# Patient Record
Sex: MALE | Race: WHITE | NOT HISPANIC OR LATINO | Employment: FULL TIME | URBAN - METROPOLITAN AREA
[De-identification: names, ages, dates, MRNs, and addresses within clinical notes are randomized per-mention and may not be internally consistent; named-entity substitution may affect disease eponyms.]

---

## 2017-01-27 ENCOUNTER — OFFICE VISIT - HEALTHEAST (OUTPATIENT)
Dept: ADDICTION MEDICINE | Facility: CLINIC | Age: 27
End: 2017-01-27

## 2017-01-27 DIAGNOSIS — Z03.89 NO DIAGNOSIS ON AXIS I: ICD-10-CM

## 2017-02-07 ENCOUNTER — OFFICE VISIT - HEALTHEAST (OUTPATIENT)
Dept: ADDICTION MEDICINE | Facility: CLINIC | Age: 27
End: 2017-02-07

## 2017-02-07 DIAGNOSIS — F10.20 SEVERE ALCOHOL USE DISORDER (H): ICD-10-CM

## 2017-02-08 ENCOUNTER — OFFICE VISIT - HEALTHEAST (OUTPATIENT)
Dept: ADDICTION MEDICINE | Facility: CLINIC | Age: 27
End: 2017-02-08

## 2017-02-08 DIAGNOSIS — F10.20 SEVERE ALCOHOL USE DISORDER (H): ICD-10-CM

## 2017-02-09 ENCOUNTER — OFFICE VISIT - HEALTHEAST (OUTPATIENT)
Dept: ADDICTION MEDICINE | Facility: CLINIC | Age: 27
End: 2017-02-09

## 2017-02-09 DIAGNOSIS — F10.20 SEVERE ALCOHOL USE DISORDER (H): ICD-10-CM

## 2017-02-10 ENCOUNTER — AMBULATORY - HEALTHEAST (OUTPATIENT)
Dept: ADDICTION MEDICINE | Facility: CLINIC | Age: 27
End: 2017-02-10

## 2017-02-13 ENCOUNTER — OFFICE VISIT - HEALTHEAST (OUTPATIENT)
Dept: ADDICTION MEDICINE | Facility: CLINIC | Age: 27
End: 2017-02-13

## 2017-02-13 DIAGNOSIS — F10.20 SEVERE ALCOHOL USE DISORDER (H): ICD-10-CM

## 2017-02-14 ENCOUNTER — OFFICE VISIT - HEALTHEAST (OUTPATIENT)
Dept: ADDICTION MEDICINE | Facility: CLINIC | Age: 27
End: 2017-02-14

## 2017-02-14 DIAGNOSIS — F10.20 SEVERE ALCOHOL USE DISORDER (H): ICD-10-CM

## 2017-02-15 ENCOUNTER — OFFICE VISIT - HEALTHEAST (OUTPATIENT)
Dept: ADDICTION MEDICINE | Facility: CLINIC | Age: 27
End: 2017-02-15

## 2017-02-15 DIAGNOSIS — F10.20 SEVERE ALCOHOL USE DISORDER (H): ICD-10-CM

## 2017-02-16 ENCOUNTER — OFFICE VISIT - HEALTHEAST (OUTPATIENT)
Dept: ADDICTION MEDICINE | Facility: CLINIC | Age: 27
End: 2017-02-16

## 2017-02-16 DIAGNOSIS — F10.20 SEVERE ALCOHOL USE DISORDER (H): ICD-10-CM

## 2017-02-19 ENCOUNTER — AMBULATORY - HEALTHEAST (OUTPATIENT)
Dept: ADDICTION MEDICINE | Facility: CLINIC | Age: 27
End: 2017-02-19

## 2017-02-20 ENCOUNTER — OFFICE VISIT - HEALTHEAST (OUTPATIENT)
Dept: ADDICTION MEDICINE | Facility: CLINIC | Age: 27
End: 2017-02-20

## 2017-02-20 DIAGNOSIS — F10.20 SEVERE ALCOHOL USE DISORDER (H): ICD-10-CM

## 2017-02-21 ENCOUNTER — OFFICE VISIT - HEALTHEAST (OUTPATIENT)
Dept: ADDICTION MEDICINE | Facility: CLINIC | Age: 27
End: 2017-02-21

## 2017-02-21 DIAGNOSIS — F10.20 SEVERE ALCOHOL USE DISORDER (H): ICD-10-CM

## 2017-02-22 ENCOUNTER — OFFICE VISIT - HEALTHEAST (OUTPATIENT)
Dept: ADDICTION MEDICINE | Facility: CLINIC | Age: 27
End: 2017-02-22

## 2017-02-22 DIAGNOSIS — F10.20 SEVERE ALCOHOL USE DISORDER (H): ICD-10-CM

## 2017-02-23 ENCOUNTER — OFFICE VISIT - HEALTHEAST (OUTPATIENT)
Dept: ADDICTION MEDICINE | Facility: CLINIC | Age: 27
End: 2017-02-23

## 2017-02-23 DIAGNOSIS — F10.20 SEVERE ALCOHOL USE DISORDER (H): ICD-10-CM

## 2017-02-24 ENCOUNTER — AMBULATORY - HEALTHEAST (OUTPATIENT)
Dept: ADDICTION MEDICINE | Facility: CLINIC | Age: 27
End: 2017-02-24

## 2017-02-27 ENCOUNTER — OFFICE VISIT - HEALTHEAST (OUTPATIENT)
Dept: ADDICTION MEDICINE | Facility: CLINIC | Age: 27
End: 2017-02-27

## 2017-02-27 DIAGNOSIS — F10.20 SEVERE ALCOHOL USE DISORDER (H): ICD-10-CM

## 2017-02-28 ENCOUNTER — OFFICE VISIT - HEALTHEAST (OUTPATIENT)
Dept: ADDICTION MEDICINE | Facility: CLINIC | Age: 27
End: 2017-02-28

## 2017-02-28 DIAGNOSIS — F10.20 SEVERE ALCOHOL USE DISORDER (H): ICD-10-CM

## 2017-03-01 ENCOUNTER — OFFICE VISIT - HEALTHEAST (OUTPATIENT)
Dept: ADDICTION MEDICINE | Facility: CLINIC | Age: 27
End: 2017-03-01

## 2017-03-01 DIAGNOSIS — F10.20 SEVERE ALCOHOL USE DISORDER (H): ICD-10-CM

## 2017-03-02 ENCOUNTER — OFFICE VISIT - HEALTHEAST (OUTPATIENT)
Dept: ADDICTION MEDICINE | Facility: CLINIC | Age: 27
End: 2017-03-02

## 2017-03-02 DIAGNOSIS — F10.20 SEVERE ALCOHOL USE DISORDER (H): ICD-10-CM

## 2017-03-06 ENCOUNTER — AMBULATORY - HEALTHEAST (OUTPATIENT)
Dept: ADDICTION MEDICINE | Facility: CLINIC | Age: 27
End: 2017-03-06

## 2017-03-06 ENCOUNTER — OFFICE VISIT - HEALTHEAST (OUTPATIENT)
Dept: ADDICTION MEDICINE | Facility: CLINIC | Age: 27
End: 2017-03-06

## 2017-03-06 DIAGNOSIS — F10.20 SEVERE ALCOHOL USE DISORDER (H): ICD-10-CM

## 2017-03-07 ENCOUNTER — OFFICE VISIT - HEALTHEAST (OUTPATIENT)
Dept: ADDICTION MEDICINE | Facility: CLINIC | Age: 27
End: 2017-03-07

## 2017-03-07 DIAGNOSIS — F10.20 SEVERE ALCOHOL USE DISORDER (H): ICD-10-CM

## 2017-03-08 ENCOUNTER — OFFICE VISIT - HEALTHEAST (OUTPATIENT)
Dept: ADDICTION MEDICINE | Facility: CLINIC | Age: 27
End: 2017-03-08

## 2017-03-08 DIAGNOSIS — F10.20 SEVERE ALCOHOL USE DISORDER (H): ICD-10-CM

## 2017-03-09 ENCOUNTER — OFFICE VISIT - HEALTHEAST (OUTPATIENT)
Dept: ADDICTION MEDICINE | Facility: CLINIC | Age: 27
End: 2017-03-09

## 2017-03-09 DIAGNOSIS — F10.20 SEVERE ALCOHOL USE DISORDER (H): ICD-10-CM

## 2017-03-10 ENCOUNTER — AMBULATORY - HEALTHEAST (OUTPATIENT)
Dept: ADDICTION MEDICINE | Facility: CLINIC | Age: 27
End: 2017-03-10

## 2017-03-13 ENCOUNTER — OFFICE VISIT - HEALTHEAST (OUTPATIENT)
Dept: ADDICTION MEDICINE | Facility: CLINIC | Age: 27
End: 2017-03-13

## 2017-03-13 DIAGNOSIS — F10.20 SEVERE ALCOHOL USE DISORDER (H): ICD-10-CM

## 2017-03-14 ENCOUNTER — COMMUNICATION - HEALTHEAST (OUTPATIENT)
Dept: ADDICTION MEDICINE | Facility: CLINIC | Age: 27
End: 2017-03-14

## 2017-03-14 ENCOUNTER — OFFICE VISIT - HEALTHEAST (OUTPATIENT)
Dept: ADDICTION MEDICINE | Facility: CLINIC | Age: 27
End: 2017-03-14

## 2017-03-14 DIAGNOSIS — F10.20 SEVERE ALCOHOL USE DISORDER (H): ICD-10-CM

## 2017-03-15 ENCOUNTER — OFFICE VISIT - HEALTHEAST (OUTPATIENT)
Dept: ADDICTION MEDICINE | Facility: CLINIC | Age: 27
End: 2017-03-15

## 2017-03-15 DIAGNOSIS — F10.20 SEVERE ALCOHOL USE DISORDER (H): ICD-10-CM

## 2017-03-16 ENCOUNTER — OFFICE VISIT - HEALTHEAST (OUTPATIENT)
Dept: ADDICTION MEDICINE | Facility: CLINIC | Age: 27
End: 2017-03-16

## 2017-03-16 DIAGNOSIS — F10.20 SEVERE ALCOHOL USE DISORDER (H): ICD-10-CM

## 2017-03-17 ENCOUNTER — AMBULATORY - HEALTHEAST (OUTPATIENT)
Dept: ADDICTION MEDICINE | Facility: CLINIC | Age: 27
End: 2017-03-17

## 2017-03-20 ENCOUNTER — OFFICE VISIT - HEALTHEAST (OUTPATIENT)
Dept: ADDICTION MEDICINE | Facility: CLINIC | Age: 27
End: 2017-03-20

## 2017-03-20 DIAGNOSIS — F10.20 SEVERE ALCOHOL USE DISORDER (H): ICD-10-CM

## 2017-03-21 ENCOUNTER — COMMUNICATION - HEALTHEAST (OUTPATIENT)
Dept: ADDICTION MEDICINE | Facility: CLINIC | Age: 27
End: 2017-03-21

## 2017-03-22 ENCOUNTER — OFFICE VISIT - HEALTHEAST (OUTPATIENT)
Dept: ADDICTION MEDICINE | Facility: CLINIC | Age: 27
End: 2017-03-22

## 2017-03-22 DIAGNOSIS — F10.20 SEVERE ALCOHOL USE DISORDER (H): ICD-10-CM

## 2017-03-24 ENCOUNTER — AMBULATORY - HEALTHEAST (OUTPATIENT)
Dept: ADDICTION MEDICINE | Facility: CLINIC | Age: 27
End: 2017-03-24

## 2017-03-27 ENCOUNTER — OFFICE VISIT - HEALTHEAST (OUTPATIENT)
Dept: ADDICTION MEDICINE | Facility: CLINIC | Age: 27
End: 2017-03-27

## 2017-03-27 DIAGNOSIS — F10.20 SEVERE ALCOHOL USE DISORDER (H): ICD-10-CM

## 2017-03-29 ENCOUNTER — OFFICE VISIT - HEALTHEAST (OUTPATIENT)
Dept: ADDICTION MEDICINE | Facility: CLINIC | Age: 27
End: 2017-03-29

## 2017-03-29 DIAGNOSIS — F10.20 SEVERE ALCOHOL USE DISORDER (H): ICD-10-CM

## 2017-03-31 ENCOUNTER — AMBULATORY - HEALTHEAST (OUTPATIENT)
Dept: ADDICTION MEDICINE | Facility: CLINIC | Age: 27
End: 2017-03-31

## 2017-04-04 ENCOUNTER — OFFICE VISIT - HEALTHEAST (OUTPATIENT)
Dept: ADDICTION MEDICINE | Facility: CLINIC | Age: 27
End: 2017-04-04

## 2017-04-04 DIAGNOSIS — F10.20 SEVERE ALCOHOL USE DISORDER (H): ICD-10-CM

## 2017-04-05 ENCOUNTER — OFFICE VISIT - HEALTHEAST (OUTPATIENT)
Dept: ADDICTION MEDICINE | Facility: CLINIC | Age: 27
End: 2017-04-05

## 2017-04-05 DIAGNOSIS — F10.20 SEVERE ALCOHOL USE DISORDER (H): ICD-10-CM

## 2017-04-07 ENCOUNTER — AMBULATORY - HEALTHEAST (OUTPATIENT)
Dept: ADDICTION MEDICINE | Facility: CLINIC | Age: 27
End: 2017-04-07

## 2017-04-10 ENCOUNTER — OFFICE VISIT - HEALTHEAST (OUTPATIENT)
Dept: ADDICTION MEDICINE | Facility: CLINIC | Age: 27
End: 2017-04-10

## 2017-04-10 DIAGNOSIS — F10.20 SEVERE ALCOHOL USE DISORDER (H): ICD-10-CM

## 2017-04-14 ENCOUNTER — AMBULATORY - HEALTHEAST (OUTPATIENT)
Dept: ADDICTION MEDICINE | Facility: CLINIC | Age: 27
End: 2017-04-14

## 2017-04-19 ENCOUNTER — OFFICE VISIT - HEALTHEAST (OUTPATIENT)
Dept: ADDICTION MEDICINE | Facility: CLINIC | Age: 27
End: 2017-04-19

## 2017-04-19 DIAGNOSIS — F10.20 SEVERE ALCOHOL USE DISORDER (H): ICD-10-CM

## 2017-04-20 ENCOUNTER — AMBULATORY - HEALTHEAST (OUTPATIENT)
Dept: ADDICTION MEDICINE | Facility: CLINIC | Age: 27
End: 2017-04-20

## 2017-04-20 ENCOUNTER — COMMUNICATION - HEALTHEAST (OUTPATIENT)
Dept: ADDICTION MEDICINE | Facility: CLINIC | Age: 27
End: 2017-04-20

## 2017-04-24 ENCOUNTER — OFFICE VISIT - HEALTHEAST (OUTPATIENT)
Dept: ADDICTION MEDICINE | Facility: CLINIC | Age: 27
End: 2017-04-24

## 2017-04-24 DIAGNOSIS — F10.20 SEVERE ALCOHOL USE DISORDER (H): ICD-10-CM

## 2017-04-26 ENCOUNTER — OFFICE VISIT - HEALTHEAST (OUTPATIENT)
Dept: ADDICTION MEDICINE | Facility: CLINIC | Age: 27
End: 2017-04-26

## 2017-04-26 DIAGNOSIS — F10.20 SEVERE ALCOHOL USE DISORDER (H): ICD-10-CM

## 2017-04-27 ENCOUNTER — AMBULATORY - HEALTHEAST (OUTPATIENT)
Dept: ADDICTION MEDICINE | Facility: CLINIC | Age: 27
End: 2017-04-27

## 2017-05-01 ENCOUNTER — OFFICE VISIT - HEALTHEAST (OUTPATIENT)
Dept: ADDICTION MEDICINE | Facility: CLINIC | Age: 27
End: 2017-05-01

## 2017-05-01 DIAGNOSIS — F10.20 SEVERE ALCOHOL USE DISORDER (H): ICD-10-CM

## 2017-05-03 ENCOUNTER — OFFICE VISIT - HEALTHEAST (OUTPATIENT)
Dept: ADDICTION MEDICINE | Facility: CLINIC | Age: 27
End: 2017-05-03

## 2017-05-03 DIAGNOSIS — F10.20 SEVERE ALCOHOL USE DISORDER (H): ICD-10-CM

## 2017-05-04 ENCOUNTER — AMBULATORY - HEALTHEAST (OUTPATIENT)
Dept: ADDICTION MEDICINE | Facility: CLINIC | Age: 27
End: 2017-05-04

## 2017-05-11 ENCOUNTER — OFFICE VISIT - HEALTHEAST (OUTPATIENT)
Dept: ADDICTION MEDICINE | Facility: CLINIC | Age: 27
End: 2017-05-11

## 2017-05-11 ENCOUNTER — AMBULATORY - HEALTHEAST (OUTPATIENT)
Dept: ADDICTION MEDICINE | Facility: CLINIC | Age: 27
End: 2017-05-11

## 2017-05-11 DIAGNOSIS — F10.20 SEVERE ALCOHOL USE DISORDER (H): ICD-10-CM

## 2017-05-15 ENCOUNTER — AMBULATORY - HEALTHEAST (OUTPATIENT)
Dept: ADDICTION MEDICINE | Facility: CLINIC | Age: 27
End: 2017-05-15

## 2017-05-18 ENCOUNTER — OFFICE VISIT - HEALTHEAST (OUTPATIENT)
Dept: ADDICTION MEDICINE | Facility: CLINIC | Age: 27
End: 2017-05-18

## 2017-05-18 DIAGNOSIS — F10.20 SEVERE ALCOHOL USE DISORDER (H): ICD-10-CM

## 2017-05-24 ENCOUNTER — AMBULATORY - HEALTHEAST (OUTPATIENT)
Dept: ADDICTION MEDICINE | Facility: CLINIC | Age: 27
End: 2017-05-24

## 2017-05-25 ENCOUNTER — OFFICE VISIT - HEALTHEAST (OUTPATIENT)
Dept: ADDICTION MEDICINE | Facility: CLINIC | Age: 27
End: 2017-05-25

## 2017-05-25 DIAGNOSIS — F10.20 SEVERE ALCOHOL USE DISORDER (H): ICD-10-CM

## 2017-05-29 ENCOUNTER — AMBULATORY - HEALTHEAST (OUTPATIENT)
Dept: ADDICTION MEDICINE | Facility: CLINIC | Age: 27
End: 2017-05-29

## 2017-06-01 ENCOUNTER — OFFICE VISIT - HEALTHEAST (OUTPATIENT)
Dept: ADDICTION MEDICINE | Facility: CLINIC | Age: 27
End: 2017-06-01

## 2017-06-01 DIAGNOSIS — F10.20 SEVERE ALCOHOL USE DISORDER (H): ICD-10-CM

## 2017-06-02 ENCOUNTER — COMMUNICATION - HEALTHEAST (OUTPATIENT)
Dept: ADDICTION MEDICINE | Facility: CLINIC | Age: 27
End: 2017-06-02

## 2017-06-06 ENCOUNTER — AMBULATORY - HEALTHEAST (OUTPATIENT)
Dept: ADDICTION MEDICINE | Facility: CLINIC | Age: 27
End: 2017-06-06

## 2017-06-08 ENCOUNTER — OFFICE VISIT - HEALTHEAST (OUTPATIENT)
Dept: ADDICTION MEDICINE | Facility: CLINIC | Age: 27
End: 2017-06-08

## 2017-06-08 DIAGNOSIS — F10.20 SEVERE ALCOHOL USE DISORDER (H): ICD-10-CM

## 2017-06-09 ENCOUNTER — AMBULATORY - HEALTHEAST (OUTPATIENT)
Dept: ADDICTION MEDICINE | Facility: CLINIC | Age: 27
End: 2017-06-09

## 2017-06-22 ENCOUNTER — OFFICE VISIT - HEALTHEAST (OUTPATIENT)
Dept: ADDICTION MEDICINE | Facility: CLINIC | Age: 27
End: 2017-06-22

## 2017-06-22 DIAGNOSIS — F10.20 SEVERE ALCOHOL USE DISORDER (H): ICD-10-CM

## 2017-06-26 ENCOUNTER — AMBULATORY - HEALTHEAST (OUTPATIENT)
Dept: ADDICTION MEDICINE | Facility: CLINIC | Age: 27
End: 2017-06-26

## 2017-06-29 ENCOUNTER — OFFICE VISIT - HEALTHEAST (OUTPATIENT)
Dept: ADDICTION MEDICINE | Facility: CLINIC | Age: 27
End: 2017-06-29

## 2017-06-29 DIAGNOSIS — F10.20 SEVERE ALCOHOL USE DISORDER (H): ICD-10-CM

## 2017-07-03 ENCOUNTER — AMBULATORY - HEALTHEAST (OUTPATIENT)
Dept: ADDICTION MEDICINE | Facility: CLINIC | Age: 27
End: 2017-07-03

## 2017-07-06 ENCOUNTER — OFFICE VISIT - HEALTHEAST (OUTPATIENT)
Dept: ADDICTION MEDICINE | Facility: CLINIC | Age: 27
End: 2017-07-06

## 2017-07-06 DIAGNOSIS — F10.20 SEVERE ALCOHOL USE DISORDER (H): ICD-10-CM

## 2017-07-10 ENCOUNTER — AMBULATORY - HEALTHEAST (OUTPATIENT)
Dept: ADDICTION MEDICINE | Facility: CLINIC | Age: 27
End: 2017-07-10

## 2017-07-13 ENCOUNTER — OFFICE VISIT - HEALTHEAST (OUTPATIENT)
Dept: ADDICTION MEDICINE | Facility: CLINIC | Age: 27
End: 2017-07-13

## 2017-07-13 DIAGNOSIS — F10.20 SEVERE ALCOHOL USE DISORDER (H): ICD-10-CM

## 2017-07-17 ENCOUNTER — AMBULATORY - HEALTHEAST (OUTPATIENT)
Dept: ADDICTION MEDICINE | Facility: CLINIC | Age: 27
End: 2017-07-17

## 2017-07-20 ENCOUNTER — OFFICE VISIT - HEALTHEAST (OUTPATIENT)
Dept: ADDICTION MEDICINE | Facility: CLINIC | Age: 27
End: 2017-07-20

## 2017-07-20 DIAGNOSIS — F10.20 SEVERE ALCOHOL USE DISORDER (H): ICD-10-CM

## 2017-07-24 ENCOUNTER — AMBULATORY - HEALTHEAST (OUTPATIENT)
Dept: ADDICTION MEDICINE | Facility: CLINIC | Age: 27
End: 2017-07-24

## 2017-08-03 ENCOUNTER — OFFICE VISIT - HEALTHEAST (OUTPATIENT)
Dept: ADDICTION MEDICINE | Facility: CLINIC | Age: 27
End: 2017-08-03

## 2017-08-03 DIAGNOSIS — F10.20 SEVERE ALCOHOL USE DISORDER (H): ICD-10-CM

## 2017-08-07 ENCOUNTER — AMBULATORY - HEALTHEAST (OUTPATIENT)
Dept: ADDICTION MEDICINE | Facility: CLINIC | Age: 27
End: 2017-08-07

## 2017-08-10 ENCOUNTER — OFFICE VISIT - HEALTHEAST (OUTPATIENT)
Dept: ADDICTION MEDICINE | Facility: CLINIC | Age: 27
End: 2017-08-10

## 2017-08-10 DIAGNOSIS — F10.20 SEVERE ALCOHOL USE DISORDER (H): ICD-10-CM

## 2017-08-14 ENCOUNTER — AMBULATORY - HEALTHEAST (OUTPATIENT)
Dept: ADDICTION MEDICINE | Facility: CLINIC | Age: 27
End: 2017-08-14

## 2021-06-08 NOTE — PROGRESS NOTES
Intake Note:   D) Santino Cutler is a 26 y.o.  single White or  male who is referred to [facility] via rule 25 assessment and parents with funding from [employer]. Patient orientated x 3. Patient meets criteria for There is no problem list on file for this patient.    Patient appears appropriate for Intensive Day Outpatient.   A)Completed intake assessment; preliminary paperwork; counselor & supervisor license number and contact info., Patient Bill of Rights, , program rules/regulations, , Abuse Prevention Plan,, confidentiality & HIPPA policies, , grievance procedure,, presented ROIs, , TB & HIV/AIDS policies & resources, and Vulnerable Adult policy, .   Conducted Vulnerable Adult Assessment yes .   R)No special Vulnerable Adult needed at this time. .   Patient signed and agreed to counselor & supervisor license number and contact info., Patient Bill of Rights, , group rules/regulations, , Abuse Prevention Plan,, confidentiality & HIPPA policies, , grievance procedure,, presented ROIs, TB & HIV/AIDS policies & resources, and Vulnerable Adult policy. Patient scored low risk on PANSI screen.   Dimension I Risk Ratin-At this time the patient displays no withdrawal signs or symptoms.  He reports that his last date of use was 17.  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  At this time the patient appears to be fully functioning.  Dimension II Risk Ratin-The patient does report that he has increased liver enzymes at this time.  He does report that he has a primary care physician which whom he sees regularly.  At this time the patient is able to get the care he needs and is able to cope with any discomfort that he may be experiencing.    Dimension III Risk Ratin-At this time the patient has difficulty with impulse control and lacks coping skills for his depression.  The patient does not report any thoughts or means of suicide or homicide.  The patient does have  difficulty managing his emotions and behaviors.  The patient reports he is currently on Lexapro as prescribed by his primary care provider.  The patient is able to participate in treatment activities and function adequately in significant life areas.  Dimension IV Risk Ratin-The patient is motivated for treatment at this time.  He does have some reinforcement as he has a pending DUI charge that will be in court for on .  The patient may be ambivalent about his disease or the need for change.  The patient reports that this is his first time in treatment although he has been told many times that he needed to go.    Dimension V Risk Rating: 3-At this time the patient has a poor recognition and understanding of relapse and recidivism issues.  The patient displays to be at a high risk for relapse as the longest period of sobriety he has had has been 10 days.  The patient reported that during those 10 days most of it was spent detoxing.  The patient would benefit from continued coping skills and relapse prevention knowledge.    Dimension VI Risk Ratin-The patient is engaged in some structured activities, he reports that he attends mens groups, AA meetings, and does work.  He is currently on a disability leave from work at this time to complete treatment.  The patient does have family and friends who are supportive of his recovery.  The patient reports a pending DUI charge in which he will present to court on 3/9/17.  The patient reports he has gotten rid of all of his using friends and reports spending a lot of time with his brother who is supportive of his recovery.T) Explained counselor & supervisor license number and contact info., Patient Bill of Rights, , program rules/regulations, , Abuse Prevention Plan,, confidentiality & HIPPA policies, , grievance procedure,, presented ROIs, , TB & HIV/AIDS policies & resources, and Vulnerable Adult policy.

## 2021-06-08 NOTE — PROGRESS NOTES
Weekly Progress Note  Santino Cutler  1990  [id number]      D) Pt attended 4 groups  this week with 0 absences. A) Staff facilitated groups and reviewed tx progress. Assessed for [facility]. R) No VAP needed at this time. Pt working on the following dimensions:  Dimension I Risk Ratin-At this time the patient displays no withdrawal signs or symptoms. He reports that his last date of use was 17. 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 At this time the patient appears to be fully functioning. The patient is to remain abstinent from all mood altering chemicals (TXPlan 1)   Dimension II Risk Ratin-The patient does report that he has increased liver enzymes at this time. He does report that he has a primary care physician which whom he sees regularly. At this time the patient is able to get the care he needs and is able to cope with any discomfort that he may be experiencing. The patient is to practice living a healthy lifestyle by getting proper nutrition, rest, and exercise (TXPlan 2)   Dimension III Risk Ratin-At this time the patient has difficulty with impulse control and lacks coping skills for his depression. The patient does not report any thoughts or means of suicide or homicide. The patient does have difficulty managing his emotions and behaviors. The patient reports he is currently on Lexapro as prescribed by his primary care provider. The patient is able to participate in treatment activities and function adequately in significant life areas.The patient appears to be stable at this time.   Dimension IV Risk Ratin-The patient is motivated for treatment at this time. He does have some reinforcement as he has a pending DUI charge that will be in court for on . The patient may be ambivalent about his disease or the need for change. The patient reports that this is his first time in treatment although he has been told many times that he needed to go. The  patient will be given an assignment packet with his written and oral assignments (TXPlan 4)  Dimension V Risk Rating: 3-At this time the patient has a poor recognition and understanding of relapse and recidivism issues. The patient displays to be at a high risk for relapse as the longest period of sobriety he has had has been 10 days. The patient reported that during those 10 days most of it was spent detoxing. The patient would benefit from continued coping skills and relapse prevention knowledge. The patient is to report any relapses he may have as soon as possible (TXPlan 5)  At this time the patient does not report any cravings, triggers, or urges to use over the last 7 days.    Dimension VI Risk Ratin-The patient is engaged in some structured activities, he reports that he attends mens groups, AA meetings, and does work. He is currently on a disability leave from work at this time to complete treatment. The patient does have family and friends who are supportive of his recovery. The patient reports a pending DUI charge in which he will present to court on 3/9/17. The patient reports he has gotten rid of all of his using friends and reports spending a lot of time with his brother who is supportive of his recovery.  The patient reports that he is getting the ignition interlock in the next 7 days.  T) Client educated on mental health. Client has completed 19 of 84 hours of program at this time. Projected discharge date is 17. Current discharge plan is relapse prevention.     Sylvie Heaton        Psycho-Educational Curriculum  Date Attended  Psycho-Educational Curriculum  Date Attended    Acceptance   Shame/Guilt     1st Step   Anger/Rage     Affirmations   Mental Health     Automatic Negative Thoughts   Anxiety  17   Cross Addiction   Co-Occurring Disorders  2/15/17   Stages of Change   Kylah/Bipolar  17   Relapse   Trauma      Addictive Thoughts   Victim Identity     Coping Skills   Sober  "Structure     Relapse Prevention   Continuum of Care     Medical Aspects   Non-12 Step Support     Brain/Neurotransmitters   Priorities     Medication Compliance   Spirituality     TEE Alcohol/Drug Research   Weekend Planner     Physical Health   Educational Videos     Post Acute Withdrawal   1st Step     Pregnancy and Drug Use   2nd Step     Sexual Health   Assertive Communication     Short-Term/Long-Term Effects   My name is Celestine HILL    Relationships   Cross Addiction     Assertive Communication   God As We Understood Him     Boundaries   HBO Relapse     Codependence    HBO What Is Addiction     Defense Mechanisms    Medical Aspects 1     Family Roles   Medical Aspects 2     Goodbye Letter   [employer]: Stress     Intimacy   PBS Depression Out of the Shadows     Needs/Dealbreakers in Relationships   The Anonymous People    Socialization Skills   [place]     Feelings   Ezekiel Mckeon \"Highjacked Brain\"    ABC Model of Emotion   Peter Gamez Humor in [place]    Grief and Loss   The Mindfulness Movie    Healthy vs. Unhealthy Feelings   Celestine HILL documentary     Meditation/Mindfulness  2/16/17 Pleasure Unwoven  2/13/17   Overconfidence/Complacency       Resentments       Stress         "

## 2021-06-08 NOTE — PROGRESS NOTES
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 (303.90)  [x] (F10.20) (303.90)   []  Cannabis Use Disorder [] Mild  [] Moderate  [] Severe [] (F12.10) (305.20)  [] (F12.20) (304.30)  [] (F12.20) (304.30)   [] Hallucinogen Use Disorder [] Mild  [] Moderate  [] Severe [] (F16.10) (305.30)  [] (F16.20) (304.50)  [] (F16.20) (304.50)   []  Inhalant Use Disorder [] Mild  [] Moderate  [] Severe [] (F18.10) (305.90)  [] (F18.20) (304.60)  [] (F18.20) (304.60)   []  Opioid Use Disorder [] Mild  [] Moderate  [] Severe [] (F11.10) (305.50)  [] (F11.20) (304.00)  [] (F11.20) (304.00)   []  Sedative, Hypnotic, or Anxiolytic Use Disorder [] Mild  [] Moderate  [] Severe [] (F13.10) (305.40)   [] (F13.20) (304.10)  [] (F13.20) (304.10)   []  Stimulant Related Disorders [] Mild  [] Moderate  [] Severe [] (F15.10) (305.70) Amphetamine type substance  [] (F14.10) (305.60) Cocaine  [] (F15.10) (305.70) Other or unspecified stimulant  [] (F15.20) (304.40) Amphetamine type substance  [] (F14.20) (304.20) Cocaine  [] (F15.20) (304.40) Other or unspecified stimulant  [] (F15.20) (304.40) Amphetamine type substance  [] (F14.20) (304.20) Cocaine  [] (F15.20) (304.40) Other or unspecified stimulant   []  Tobacco use Disorder [] Mild  [] Moderate  [] Severe [] (Z72.0)  (305.1)  [] (F17.200) (305.1)  [] (F17.200) (305.1)   []  Other (or unknown) Substance Use Disorder [] Mild  [] Moderate  [] Severe [] (F19.10) (305.90)  [] (F19.20) (304.90)  [] (F19.20) (304.90)       Suggested Level of Care Necessary for Recovery  []  Inpatient  []  Extended Care []  Residential [x]  Outpatient  []  None            Collateral Contact Summary   Number of contacts made:  1   Contact with referring person:  yes     If court related records were reviewed, summarize here:       [x]   Information from collateral contacts supported/largely agreed with information from the client and associated risk ratings.   []   Information from collateral contacts was significantly different from information from the client and lead to different risk ratings.      Summarize new information here:      Rule 25 Assessment Summary and Plan   's Recommendation    CD Outpatient treatment            Collateral Contacts     Please duplicate this page for each contact.  If this includes information which is sensitive and not public, separate this page from the rest of the assessment before sharing.  Retain the page in the assessment file.   Name    Dima Cutler Relationship    Father Phone Number    [phone] Releases    yes       Information Provided:      Information from collateral coincided with assessment    Collateral Contacts     Name    Selene Bourgeois   Relationship    Parnter   Phone Number    [phone] Releases    yes       Information Provided:        Staff Name and Title:  Adam Tyler MBA, MSW, LGAMILCAR, [facility]    Date:  1/27/2017  Time:  8:25 AM

## 2021-06-08 NOTE — PROGRESS NOTES
Opened in error, disregard  Mary Ann Chen MA, [facility]     Asthma  no recent exacerbations  Cervical disc disease  cervical fusion  Chiari I malformation  decompression Feb 2018  EDS (Su-Danlos syndrome)    Irritable bowel syndrome without diarrhea  with constipation  Low back pain  chronic x 2 1/2 years  Tethered cord syndrome

## 2021-06-08 NOTE — PROGRESS NOTES
1:1 Session-  The patient and this writer met for a 1 hour 1:1 session to review his treatment plan and to discuss how his first week in the program is going.  The patient reviewed his treatment plan and signed it during this session, he did not want to add anything to his tx plan and approved of the things that were in it.  The patient reported that he was able to get his license back over the weekend and is taking care of his ignition interlock system that will be put into his car on Wednesday.  The patient reports feelings of anxiety at this time as he is preparing to tell his fiance's parents that he got a DWI and that he is an alcoholic.  The patient reports that they are strict [employer] who do not drink at all.  The patient appears to be optimistic and excited about the changes he is making at this time.  The patient denied any thoughts or means of harming himself or others and appeared stable.      Sylvie Heaton 2/13/2017

## 2021-06-08 NOTE — PROGRESS NOTES
[facility]   Mental Health and Addiction Care   [facility], [facility], and [employer]    [phone] or [phone]   Master Plan      Client Name:  Santino Cutler  MRN: [id number]   Counselor: Sylvie Heaton    Title: Dimension I Risk Ratin  Plan Date:   2/10/2017   Diagnosis: Alcohol Use Disorder, severe  Problem: At this time the patient displays no withdrawal signs or symptoms. He reports that his last date of use was 17. 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 At this time the patient appears to be fully functioning.      Goal: Begin Date: 2/10/17 Target Date: 5/10/17  Maintain abstinence throughout  Outpatient Treatment in order to avoid experiencing withdrawal symptoms and to meet OP expectations.   Method 1: Begin Date:2/10/17 Target Date: 5/10/17 Date Completed:   Attend OP groups as directed and share thoughts, feelings and urges to use, as well as sober supports with staff and peers in order to maintain awareness of details shaping your recovery process.  Method 2: Begin Date:2/10/17 Target Date: 5/10/17 Date Completed:   The patient is to comply with all UA and breathalyzer requests by staff.      Title: Dimension II Risk Ratin  Plan Date:   2/10/2017  Diagnosis:   Alcohol Use Disorder, Severe  Problem:The patient does report that he has increased liver enzymes at this time. He does report that he has a primary care physician which whom he sees regularly. At this time the patient is able to get the care he needs and is able to cope with any discomfort that he may be experiencing.     Goal: Begin Date: 2/10/17 Target Date: 5/10/17  Practice living a healthy lifestyle on a daily basis with proper rest, nutrition and exercise.   Method 1: Begin Date:2/10/17 Target Date: 5/10/17 Date Completed:   Continue to follow recommendations from your personal care provider regarding medical issues. Inform staff immediately of any changes in your health  that may affect your active participation in group therapy or attendance.  Method 2: Begin Date:2/10/17 Target Date: 5/10/17 Date Completed:  The patient is to continue monitoring his liver enzymes with his primary provider.   Is Nicotine dependence indicated on the assessment? Yes (Chew)   Method 2: Begin Date:2/10/17 Target Date: 5/10/17 Date Completed:  Staff will provide client with information on tobacco cessation,and tools for quitting.       Title: Dimension III Risk Ratin  Plan Date:   2/10/2017  Diagnosis:  Alcohol Use Disorder, Severe  Problem:At this time the patient has difficulty with impulse control and lacks coping skills for his depression. The patient does not report any thoughts or means of suicide or homicide. The patient does have difficulty managing his emotions and behaviors. The patient reports he is currently on Lexapro as prescribed by his primary care provider. The patient is able to participate in treatment activities and function adequately in significant life areas.    Goal: Begin Date: 2/10/17 Target Date: 5/10/17   To treat mental health effectively while attending treatment in order to increase your ability to meet goals and treatment expectations.   Method 1: Begin Date:2/10/17 Target Date: 5/10/17 Date Completed:   Begin to journal on a daily basis recording feelings and event of the day. Reflecting on this daily. Report in group how this is beneficial.  Method 2: Begin Date:2/10/17 Target Date: 5/10/17 Date Completed:   Identify 3 personal traits you like about yourself and 3 that you would like to see changes in. Develop a plan to initiate those changes. What will need to happen for these goals to be successful? Share this with counselor.       Title: Dimension IV Risk Ratin  Plan Date:   2/10/2017  Diagnosis:   Alcohol Use Disorder, Severe  Problem:The patient is motivated for treatment at this time. He does have some reinforcement as he has a pending DUI charge that will  "be in court for on March 9th. The patient may be ambivalent about his disease or the need for change. The patient reports that this is his first time in treatment although he has been told many times that he needed to go.     Goal: Begin Date: 2/10/17 Target Date: 5/10/17  To follow through with intentions to treat chemical dependency concerns while meeting OP treatment expectations in order to graduate successfully from our program.   Method 1: Begin Date:2/10/17 Target Date: 4/4/10 Date Completed:   Complete 1st Step (Use History and Consequences) assignment while in Phase I of treatment and present to peers in group. Reflect on feedback received from peers and report findings to staff.   Method 2: Begin Date:2/10/17 Target Date: 5/5/17  Date Completed:   Complete all written assignments as assigned by staff including your \"goodbye letter\" and \"relapse prevention plan\" prior to graduation.  Contact staff with questions or concerns about written and oral assignments while attending group therapy.  Method 3: Begin Date:2/10/17 Target Date: 5/10/17 Date Completed:   If for any reason you cannot attend group therapy or you will be tardy, contact staff as soon as possible at [phone] or [phone]. Three unexcused absences  is considered voluntary discharge from the outpatient program.      Title: Dimension V Risk Rating: 3  Plan Date:   2/10/2017  Diagnosis:   Alcohol Use Disorder, Severe  Problem: At this time the patient has a poor recognition and understanding of relapse and recidivism issues. The patient appears to be at a high risk for relapse as the longest period of sobriety he has had has been 10 days. The patient reported that during those 10 days most of it was spent detoxing. The patient would benefit from continued coping skills and relapse prevention knowled    Goal: Begin Date: 2/10/17Target Date: 5/10/17  To deal effectively with relapse triggers and stressors while building coping skills in order " to handle life events without resorting to drug/alcohol use.   Method 1: Begin Date:2/10/17 Target Date: 5/10/17 Date Completed:   Participate in OP group check-ins consistently as way of increasing self-awareness and connecting honestly with staff and peers.   Method 2: Begin Date:2/10/17 Target Date: 5/10/17 Date Completed:   Develop a list of 7 triggers to your use and identify 10 coping skills you can use to arrest the urge to use. Submit to counselor when finished.    Method 3: Begin Date:2/10/17 Target Date: 5/10/17Date Completed:   Report any relapses, if any, on any substances of abuse to staff immediately.       Title: Dimension VI Risk Ratin  Plan Date:   2/10/2017  Diagnosis:  Alcohol Use Disorder, Severe  Problem:The patient is engaged in some structured activities, he reports that he attends mens groups, AA meetings, and does work. He is currently on a disability leave from work at this time to complete treatment. The patient does have family and friends who are supportive of his recovery. The patient reports a pending DUI charge in which he will present to court on 3/9/17. The patient reports he has gotten rid of all of his using friends and reports spending a lot of time with his brother who is supportive of his recovery    Goal: Begin Date: 2/10/17 Target Date: 5/10/17  To build meaningful structure into your weekly schedule by attending specific recovery activities on a daily basis   Method 1: Begin Date:2/10/17 Target Date: 5/10/17 Date Completed:   Find 2 sober support groups you feel comfortable attending on a weekly basis and inform counselor how these meetings are impacting you.Obtain a sponsor before 2nd phase of treatment.    Method 2: Begin Date:2/10/17 Target Date: 5/10/17  Date Completed:   Develop a Bucket list. What activities would you like to take part in. Set goals in intervals.  such as 3 month, 6 month,1 year, 3 years and 5 years, what barriers do you identify at this time for  these goals to happen.   Method 3: Begin Date:2/10/17 Target Date: 5/10/17  Date Completed:   Invite a family member or concerned other who is supportive of your recovery to meet for a family session with your primary counselor, in the effort to help them understand addiction and the causes and to gain knowledge of self care for themselves and for your recovery.      By signing this document, I am acknowledging that I was actively and directly involved in the development of my treatment plan.   I have been a participant in the creation of my individual treatment plan.       Client Signature_________________________________________         Date__________________         Staff Signature Sylvie Heaton 2/10/2017

## 2021-06-08 NOTE — PROGRESS NOTES
Addiction Services - Initial Services Plan      Name:  Santino Cutler       :  1990        MRN:  [id number]    Goal Methods   1.  Acceptance of chemical dependency and mental illness as a disease. A.  Comply with all med/psych recommendations  B.  Complete preliminary interviews  C.  Attend all program functions  D.  Attend all individual counseling sessions  E.  Read all assigned literature  F.  Complete psychological testing as assigned  G.  Particpate in any necessary consultations     2.  Acceptance of my need and ability to change A.  Complete written workbook assignments on MICD  B.  Participate in conferences (P.O., , family)  C.  Participate in 12 Step/support groups  D.  Actively participate in treatment planning and reviews  E.  Complete all assignments given or recommended on Treatment Plan  F.  Present Drug History/Peer Assessment with peer group  G.  Complete 10th Step Inventory daily   3.  Acceptance of staff recommendations as a means to my recovery A.  Participate in all interviews for Continuum of Care Plans  B.  Familiarize self with 12 Step Recovery Program and how this applies to your day-to-day behavior  C.  Demonstrate a working knowledge of AA steps of recovery  D.  Obtain a sponsor     Patient describes their immediate need:  The patient does not identify any at this time.    Are there any immediate Safety Needs such as (physical, stability, mobility):  None identified.     Immediate Health Needs and Plan:    Continue taking medications.   Remain abstinent   Vulnerable Adult:  No    [x] Continue Current Medications for:  Lexapro 10mg 1x per day  [] Request Consult for:  [] Notify Attending Physician about:  [] Other:      Issues to be addressed  the first sessions:    No immediate needs to be addressed  Introduce the patient to the group    Patient strengths and needs:  Strong willed, compassionate, hard working, in tune with others emotions, wrestler, creative.      Addictive personality, boredom.    Plan for patient for time between intake and completion of the treatment plan:  Attend groups.   Attend AA Meetings  Attend any other scheduled appointments     Staff Members' Titles authorized to Initiate Services are:    Director of Behavioral Service    Clinical Director of Chemical Dependency    Primary Counselor    MI/SHON Li. Counselor        Nursing Staff    Vulnerable Adult Review  [x] Review of the facility Abuse Prevention plan was reviewed with the patient  [] No individual abuse plan is necessary  [] In addition to the facility Abuse Prevention plan, an Individual Abuse Plan will be put in place    I understand these goals to be the Treatment Goals of the Program, and I agree to the stated Methods in attempting to accomplish these goals.    Patient Signature:  _________________________Date:  2/7/2017      Staff Name/Title: Sylvie Heaton   Date:  2/7/2017  Time: 10:37 AM

## 2021-06-08 NOTE — PROGRESS NOTES
Weekly Progress Note  Santino Cutler  1990  [id number]      D) Pt attended 2 groups  this week with 0 absences. A) Staff facilitated groups and reviewed tx progress. Assessed for [facility]. R) No VAP needed at this time. Pt working on the following dimensions:  Dimension I Risk Ratin-At this time the patient displays no withdrawal signs or symptoms. He reports that his last date of use was 17. 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 At this time the patient appears to be fully functioning. The patient is to remain abstinent from all mood altering chemicals (TXPlan 1)   Dimension II Risk Ratin-The patient does report that he has increased liver enzymes at this time. He does report that he has a primary care physician which whom he sees regularly. At this time the patient is able to get the care he needs and is able to cope with any discomfort that he may be experiencing. The patient is to practice living a healthy lifestyle by getting proper nutrition, rest, and exercise (TXPlan 2)   Dimension III Risk Ratin-At this time the patient has difficulty with impulse control and lacks coping skills for his depression. The patient does not report any thoughts or means of suicide or homicide. The patient does have difficulty managing his emotions and behaviors. The patient reports he is currently on Lexapro as prescribed by his primary care provider. The patient is able to participate in treatment activities and function adequately in significant life areas.The patient appears to be stable at this time.    Dimension IV Risk Ratin-The patient is motivated for treatment at this time. He does have some reinforcement as he has a pending DUI charge that will be in court for on . The patient may be ambivalent about his disease or the need for change. The patient reports that this is his first time in treatment although he has been told many times that he needed to go. The  patient will be given an assignment packet with his written and oral assignments (TXPlan 4)  Dimension V Risk Rating: 3-At this time the patient has a poor recognition and understanding of relapse and recidivism issues. The patient displays to be at a high risk for relapse as the longest period of sobriety he has had has been 10 days. The patient reported that during those 10 days most of it was spent detoxing. The patient would benefit from continued coping skills and relapse prevention knowledge. The patient is to report any relapses he may have as soon as possible (TXPlan 5)   Dimension VI Risk Ratin-The patient is engaged in some structured activities, he reports that he attends mens groups, AA meetings, and does work. He is currently on a disability leave from work at this time to complete treatment. The patient does have family and friends who are supportive of his recovery. The patient reports a pending DUI charge in which he will present to court on 3/9/17. The patient reports he has gotten rid of all of his using friends and reports spending a lot of time with his brother who is supportive of his recovery.  T) Client educated on feelings. Client has completed 6 of 84 hours of program at this time. Projected discharge date is 17. Current discharge plan is relapse prevention group.     Sylvie Heaton        Psycho-Educational Curriculum  Date Attended  Psycho-Educational Curriculum  Date Attended    Acceptance   Shame/Guilt     1st Step   Anger/Rage     Affirmations   Mental Health     Automatic Negative Thoughts   Anxiety     Cross Addiction   Co-Occurring Disorders     Stages of Change   Kylah/Bipolar     Relapse   Trauma      Addictive Thoughts   Victim Identity     Coping Skills   Sober Structure     Relapse Prevention   Continuum of Care     Medical Aspects   Non-12 Step Support     Brain/Neurotransmitters   Priorities     Medication Compliance   Spirituality     TEE Alcohol/Drug Research    "Weekend Planner     Physical Health   Educational Videos     Post Acute Withdrawal   1st Step     Pregnancy and Drug Use   2nd Step     Sexual Health   Assertive Communication     Short-Term/Long-Term Effects   My name is Celestine HILL    Relationships   Cross Addiction     Assertive Communication   God As We Understood Him     Boundaries   HBO Relapse     Codependence    HBO What Is Addiction     Defense Mechanisms    Medical Aspects 1     Family Roles   Medical Aspects 2     Goodbye Letter   [employer]: Stress     Intimacy   PBS Depression Out of the Shadows     Needs/Dealbreakers in Relationships   The Anonymous People    Socialization Skills   [place]     Feelings   Ezekiel Mckeon \"Highjacked Brain\"    Anger 2/6/17     Brianna 2/8/17     ABC Model of Emotion   Peter Gamez Humor in [place]    Grief and Loss   The Mindfulness Movie    Healthy vs. Unhealthy Feelings   Celestine HILL documentary     Meditation/Mindfulness  2/9/17     Overconfidence/Complacency       Resentments  2/7/17     Stress         "

## 2021-06-09 NOTE — PROGRESS NOTES
Weekly Progress Note  Santino Cutler  1990  [id number]  D) Pt attended four groups this week with no absences. A) Staff facilitated groups and reviewed tx progress. [facility] for [facility]. R) No VAP needed at this time. Pt working on the following dimensions:  Dimension I Risk Ratin - The patient described a pattern of increasing tolerance for the mood-altering substance, as he needed to use more of it to obtain the desired effect. At this time, the patient displays no withdrawal signs or symptoms and appears to be fully functioning. He reports that his last date of use was 17. 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  The patient reports ongoing abstinence.  Dimension II Risk Ratin - The patient reports his liver enzymes have normalized since he has abstained from drinking.  He continues to see his primary care doctor for any health-related issues, and for his recent diagnosis of diverticulitis.  Dimension III Risk Ratin-The patient reports difficulty with impulse control and lacks coping skills to manage his depression symptoms. The patient reports he is currently on Lexapro as prescribed by his primary care provider and reports the medication is beneficial in stabilizing his mood.  The patient reports his depression symptoms have lessened since he has abstained from alcohol.  Dimension IV Risk Ratin-The patient is motivated for treatment at this time.  The patient may be ambivalent about his disease or the need for change. The patient now openly admits to the severe negative consequences brought on by his substance abuse.  Dimension V Risk Ratin- The patient share in group how he is building coping skills and learning relapse prevention tools. The patient's list of the ways substance abuse has had a negative impact was processed, and each negative impact was reinforced with him.  The patient shared his experiences in the ED and at [facility].  Group members processed  these experiences with the patient and feedback was provided.  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  He reports that he regularly attends a mens support group, AA meetings, and has obtained a sponsor. The patient reports his family, friends, and girlfriend are supportive of his recovery.  The patient identified during relationship week that he secured his medical records so his father (a physician) could not locate him or find evidence of his alcoholism.  The patient also shared in group how he informed his girlfriend's parents that he had a drinking problem.  T) Client educated on Relationships. Client has completed 70 of 84 hours of program at this time. Projected discharge date is 17. Current discharge plan is Relapse Prevention.     KENJI May        Psycho-Educational Curriculum  Date Attended  Psycho-Educational Curriculum  Date Attended    Acceptance   Shame/Guilt     1st Step  2016 Anger/Rage     Affirmations  3/02/2017 Mental Health     Automatic Negative Thoughts  3/01/2017 Anxiety     Cross Addiction  3/01/217 Co-Occurring Disorders     Stages of Change  2017 Kylah/Bipolar     Relapse   Trauma      Addictive Thoughts  3/06/2017, 3/09/2017 Victim Identity     Coping Skills  3/08/2017 Sober Structure     Relapse Prevention  3/07/2017 Continuum of Care     Medical Aspects   Non-12 Step Support     Brain/Neurotransmitters  3/13/2017 Priorities     Medication Compliance  3/14/2017 Spirituality     TEE Alcohol/Drug Research  3/14/2017 Weekend Planner       Balance      Goals      8 Dimensions of wellness      Bucket list    Physical Health  3/15/2017 Educational Videos     Post Acute Withdrawal  3/17/2017 1st Step     Pregnancy and Drug Use  3/17/2017 2nd Step     Sexual Health  3/14/2017 Assertive Communication     Short-Term/Long-Term Effects  3/16/2017  "My name is Celestine HILL    Relationships   Cross Addiction     Assertive Communication  3/20/2017 God As We Understood Him     Boundaries  3/20/2017 HBO Relapse     Codependence    HBO What Is Addiction     Defense Mechanisms    Medical Aspects 1     Family Roles  3/22/2017 Medical Aspects 2     Goodbye Letter  3/22/2017 [employer]: Stress     Intimacy   PBS Depression Out of the Shadows     Needs/Dealbreakers in Relationships   The Anonymous People    Socialization Skills  3/20/2017 [place]     Feelings   Ezekiel Mckeon \"Highjacked Brain\"    ABC Model of Emotion   Peter Gamez Humor in Tx    Grief and Loss   The Mindfulness Movie    Healthy vs. Unhealthy Feelings   Celestine HILL documentary     Meditation/Mindfulness  2/23/2017     Overconfidence/Complacency       Resentments       Stress         "

## 2021-06-09 NOTE — PROGRESS NOTES
Weekly Progress Note  Santino Cutler  1990  [id number]      D) Pt attended 4 groups  this week with 0 absences. A) Staff facilitated groups and reviewed tx progress. Assessed for [facility]. R) No VAP needed at this time. Pt working on the following dimensions:  Dimension I Risk Ratin-At this time the patient displays no withdrawal signs or symptoms. He reports that his last date of use was 17. 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 At this time the patient appears to be fully functioning. The patient is to remain abstinent from all mood altering chemicals (TXPlan 1) The patient has not endorsed any use over the past 7 days.   Dimension II Risk Ratin-The patient does report that he has increased liver enzymes at this time, in the patients last check up the patient reports that the doctor told him that his liver is on the mend. The patient appeared optimistic about this and will continue to see his physician. He does report that he has a primary care physician which whom he sees regularly. At this time the patient is able to get the care he needs and is able to cope with any discomfort that he may be experiencing. The patient is to practice living a healthy lifestyle by getting proper nutrition, rest, and exercise (TXPlan 2)   Dimension III Risk Ratin-At this time the patient has difficulty with impulse control and lacks coping skills for his depression. The patient does not report any thoughts or means of suicide or homicide. The patient does have difficulty managing his emotions and behaviors. The patient reports he is currently on Lexapro as prescribed by his primary care provider. The patient is able to participate in treatment activities and function adequately in significant life areas.The patient appears to be stable at this time.   Dimension IV Risk Ratin-The patient is motivated for treatment at this time. He does have some reinforcement as he has a pending  DUI charge that will be in court for on . The patient may be ambivalent about his disease or the need for change. The patient reports that this is his first time in treatment although he has been told many times that he needed to go. The patient will be given an assignment packet with his written and oral assignments (TXPlan 4) The patient is a good participant in the group.  He may be going back to work on  and is wanting to go to the EO group, the patient was told that he needs to have a set schedule before he will be transferred.   Dimension V Risk Rating: 3-At this time the patient has a poor recognition and understanding of relapse and recidivism issues. The patient displays to be at a high risk for relapse as the longest period of sobriety he has had has been 10 days. The patient reported that during those 10 days most of it was spent detoxing. The patient would benefit from continued coping skills and relapse prevention knowledge. The patient is to report any relapses he may have as soon as possible (TXPlan 5) At this time the patient does not report any cravings, triggers, or urges to use over the last 7 days.   Dimension VI Risk Ratin-The patient is engaged in some structured activities, he reports that he attends mens groups, AA meetings, and does work. He is currently on a disability leave from work at this time to complete treatment. The patient does have family and friends who are supportive of his recovery, he also reports that he recently got a sponsor. The patient reports a pending DUI charge in which he will present to court on 3/9/17. The patient reports he has gotten rid of all of his using friends and reports spending a lot of time with his brother who is supportive of his recovery. The patient reports that he has gotten ignition interlock and has his car back.  The patient projected that he will be going back to work on 17.   T) Client educated on sober structure. Client  "has completed 31 of 84 hours of program at this time. Projected discharge date is 5/8/17. Current discharge plan is relapse prevention.     Sylvie Heaton        Psycho-Educational Curriculum  Date Attended  Psycho-Educational Curriculum  Date Attended    Acceptance   Shame/Guilt     1st Step   Anger/Rage     Affirmations   Mental Health     Automatic Negative Thoughts   Anxiety     Cross Addiction   Co-Occurring Disorders     Stages of Change   Kylah/Bipolar     Relapse   Trauma      Addictive Thoughts   Victim Identity     Coping Skills   Sober Structure     Relapse Prevention   Continuum of Care     Medical Aspects   Non-12 Step Support     Brain/Neurotransmitters   Priorities     Medication Compliance   Spirituality     TEE Alcohol/Drug Research   Weekend Planner       Balance 2/20     Goals 2/21     8 Dimensions of wellness 2/22     Bucket list 2/23   Physical Health   Educational Videos     Post Acute Withdrawal   1st Step     Pregnancy and Drug Use   2nd Step     Sexual Health   Assertive Communication     Short-Term/Long-Term Effects   My name is Celestine HILL    Relationships   Cross Addiction     Assertive Communication   God As We Understood Him     Boundaries   HBO Relapse     Codependence    HBO What Is Addiction     Defense Mechanisms    Medical Aspects 1     Family Roles   Medical Aspects 2     Goodbye Letter   [employer]: Stress     Intimacy   PBS Depression Out of the Shadows     Needs/Dealbreakers in Relationships   The Anonymous People    Socialization Skills   [place]     Feelings   Ezekiel Mckeon \"Highjacked Brain\"    ABC Model of Emotion   Peter Gamez Humor in Tx    Grief and Loss   The Mindfulness Movie    Healthy vs. Unhealthy Feelings   Celestine HILL documentary     Meditation/Mindfulness  2/23     Overconfidence/Complacency       Resentments       Stress         "

## 2021-06-09 NOTE — PROGRESS NOTES
Weekly Progress Note  Santino Cutler  1990  [id number]  D) Pt attended four groups this week with no absences. A) Staff facilitated groups and reviewed tx progress. Assessed for [facility]. R) No VAP needed at this time. Pt working on the following dimensions:  Dimension I Risk Ratin - The patient described a pattern of increasing tolerance for the mood-altering substance, as he needed to use more of it to obtain the desired effect. At this time, the patient displays no withdrawal signs or symptoms and appears to be fully functioning. He reports that his last date of use was 17. 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  The patient reports ongoing abstinence.  Dimension II Risk Ratin - The patient reports his liver enzymes have normalized since he has abstained from drinking.  He continues to see his primary care doctor for any health-related issues, and for his recent diagnosis of diverticulitis.  The patient reported during medical aspects week, that abstaining from alcohol has improved his health and he had been previously warned by his physician about the impact alcohol has on his physical health.   Dimension III Risk Ratin-The patient reports difficulty with impulse control and lacks coping skills to manage his depression symptoms. The patient reports he is currently on Lexapro as prescribed by his primary care provider and reports the medication is beneficial in stabilizing his mood.  Dimension IV Risk Ratin-The patient is motivated for treatment at this time.  The patient may be ambivalent about his disease or the need for change. The patient now openly admits to the severe negative consequences brought on by his substance abuse.  Dimension V Risk Ratin- The patient share in group how he is building coping skills and learning relapse prevention tools. The patient's list of the ways substance abuse has had a negative impact was processed, and each negative impact was reinforced  with him.  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  He reports that he regularly attends a mens support group, AA meetings, and has obtained a sponsor. The patient reports his family, friends, and girlfriend are supportive of his recovery.    T) Client educated on Medical Aspects. Client has completed 64 of 84 hours of program at this time. Projected discharge date is 17. Current discharge plan is Relapse Prevention.     KENJI May        Psycho-Educational Curriculum  Date Attended  Psycho-Educational Curriculum  Date Attended    Acceptance   Shame/Guilt     1st Step  2016 Anger/Rage     Affirmations  3/02/2017 Mental Health     Automatic Negative Thoughts  3/01/2017 Anxiety     Cross Addiction  3/01/217 Co-Occurring Disorders     Stages of Change  2017 Kylah/Bipolar     Relapse   Trauma      Addictive Thoughts  3/06/2017, 3/09/2017 Victim Identity     Coping Skills  3/08/2017 Sober Structure     Relapse Prevention  3/07/2017 Continuum of Care     Medical Aspects   Non-12 Step Support     Brain/Neurotransmitters  3/13/2017 Priorities     Medication Compliance  3/14/2017 Spirituality     TEE Alcohol/Drug Research  3/14/2017 Weekend Planner       Balance      Goals      8 Dimensions of wellness      Bucket list    Physical Health  3/15/2017 Educational Videos     Post Acute Withdrawal  3/17/2017 1st Step     Pregnancy and Drug Use  3/17/2017 2nd Step     Sexual Health  3/14/2017 Assertive Communication     Short-Term/Long-Term Effects  3/16/2017 My name is Celestine HILL    Relationships   Cross Addiction     Assertive Communication   God As We Understood Him     Boundaries   HBO Relapse     Codependence    HBO What Is Addiction     Defense Mechanisms    Medical Aspects 1     Family Roles   Medical Aspects 2     Goodbye Letter   [employer]: Stress     Intimacy    "PBS Depression Out of the Shadows     Needs/Dealbreakers in Relationships   The Anonymous People    Socialization Skills   [place]     Feelings   Ezekiel Mckeon \"Highjacked Brain\"    ABC Model of Emotion   Peter Gaemz Humor in Tx    Grief and Loss   The Mindfulness Movie    Healthy vs. Unhealthy Feelings   Celestine HILL documentary     Meditation/Mindfulness  2/23/2017     Overconfidence/Complacency       Resentments       Stress         "

## 2021-06-09 NOTE — PROGRESS NOTES
Weekly Progress Note  Santino Cutler  1990  [id number]  D) Pt attended two groups this week with no absences. A) Staff facilitated groups and reviewed tx progress. [facility] for [facility]. R) No VAP needed at this time. Pt working on the following dimensions:  Dimension I Risk Ratin. No Concern. The patient described a pattern of increasing tolerance for the mood-altering substance, as he needed to use more of it to obtain the desired effect. At this time, the patient displays no withdrawal signs or symptoms and appears to be fully functioning. He reports that his last date of use was 17. 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  The patient reports ongoing abstinence since enrolling in programming.  Dimension II Risk Ratin.  No Concern. The patient reports his liver enzymes have normalized since he has abstained from drinking.  He continues to see his primary care doctor for any health-related issues, and for his recent diagnosis of diverticulitis.  Dimension III Risk Ratin.  Mild Concern.  The patient reports difficulty with impulse control and lacks coping skills to manage his depression symptoms. The patient reports he is currently on Lexapro as prescribed by his primary care provider and reports the medication is beneficial in stabilizing his mood.  The patient reports his depression symptoms have lessened since he has abstained from alcohol.  Dimension IV Risk Ratin.  No Concern. The patient is motivated for treatment at this time.  The patient may be ambivalent about his disease or the need for change. The patient now openly admits to the severe negative consequences brought on by his substance abuse.  The patient reports attending Alcoholics Anonymous meetings and working with a sponsor are the most helpful ways he curbs cravings for alcohol.  Dimension V Risk Ratin.  Moderate Concern. The patient share in group how he is building coping skills and learning relapse prevention  tools. The patient's list of the ways substance abuse has had a negative impact was processed, and each negative impact was reinforced with him.  The patient shared his feelings that often trigger him to relapse and discussed resentments towards his girlfriend's family members.   Group members processed these experiences with the patient and feedback was provided.  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  He reports that he regularly attends a men's support group, AA meetings, and has obtained a sponsor. The patient reports his family, friends, and girlfriend are supportive of his recovery.  The patient identified reports he is planning to pursue graduate school for counseling or a nurse practitioner program.  Writer has recommended to the patient that he gain more time in sobriety before making career decisions.  T) Client educated on Feelings. Client has completed 76 of 84 hours of program at this time. Projected discharge date is 17. Current discharge plan is Relapse Prevention.     KENJI May        Psycho-Educational Curriculum  Date Attended  Psycho-Educational Curriculum  Date Attended    Acceptance   Shame/Guilt     1st Step  2016 Anger/Rage  3/27/2017   Affirmations  3/02/2017 Mental Health     Automatic Negative Thoughts  3/01/2017 Anxiety     Cross Addiction  3/01/217 Co-Occurring Disorders     Stages of Change  2017 Kylah/Bipolar     Relapse   Trauma      Addictive Thoughts  3/06/2017, 3/09/2017 Victim Identity     Coping Skills  3/08/2017 Sober Structure     Relapse Prevention  3/07/2017 Continuum of Care     Medical Aspects   Non-12 Step Support     Brain/Neurotransmitters  3/13/2017 Priorities     Medication Compliance  3/14/2017 Spirituality     TEE Alcohol/Drug Research  3/14/2017 Weekend Planner       Balance      Goals      8 Dimensions of wellness   "    Bucket list 2/23   Physical Health  3/15/2017 Educational Videos     Post Acute Withdrawal  3/17/2017 1st Step     Pregnancy and Drug Use  3/17/2017 2nd Step     Sexual Health  3/14/2017 Assertive Communication     Short-Term/Long-Term Effects  3/16/2017 My name is Celestine HILL    Relationships   Cross Addiction     Assertive Communication  3/20/2017 God As We Understood Him     Boundaries  3/20/2017 HBO Relapse     Codependence    HBO What Is Addiction     Defense Mechanisms    Medical Aspects 1     Family Roles  3/22/2017 Medical Aspects 2     Goodbye Letter  3/22/2017 [employer]: Stress     Intimacy   PBS Depression Out of the Shadows     Needs/Dealbreakers in Relationships   [employer] People    Socialization Skills  3/20/2017 [place]     Feelings   Ezekiel Mckeon \"Highjacked Brain\"    ABC Model of Emotion   Peter Gamez Humor in Tx    Grief and Loss  3/29/2017 The Mindfulness Movie    Healthy vs. Unhealthy Feelings   Celestine HILL documentary     Meditation/Mindfulness  2/23/2017,  3/29/2017     Overconfidence/Complacency       Resentments       Stress         "

## 2021-06-09 NOTE — PROGRESS NOTES
Weekly Progress Note  Santino Cutler  1990  [id number]  Addendum  Transfer Note to Evening Outpatient     D) Pt attended four groups this week with no absences. Patient transferred from DOP to EOP on 17 due to his return to work. A) Staff facilitated groups and reviewed tx progress. [facility] for [facility]. R) No VAP needed at this time. Pt working on the following dimensions:  Dimension I Risk Ratin - The patient described a pattern of increasing tolerance for the mood-altering substance, as he needed to use more of it to obtain the desired effect.At this time the patient displays no withdrawal signs or symptoms. He reports that his last date of use was 17. 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 At this time the patient appears to be fully functioning. The patient is to remain abstinent from all mood altering chemicals.  The patient reports ongoing abstinence.  Dimension II Risk Ratin - The patient does report that he has increased liver enzymes at this time, in the patients last check up the patient reports that the doctor told him that his liver is on the mend. The patient appeared optimistic about this and will continue to see his physician. He does report that he has a primary care physician which whom he sees regularly. At this time the patient is able to get the care he needs and is able to cope with any discomfort that he may be experiencing.  During group programming on 3/09/2017, the patient complained of abdominal pain.  During break from programming, patient stated his abdominal pain worsened and he wanted to seek medical attention. Writer suggested patient go to the ED immediately. Patient stated he was unsure of where the ED was located so writer called security and they escorted him to the ED.  Patient was admitted to the ED, treated, and released the same evening.  Dimension III Risk Ratin-The patient reports difficulty with impulse control and lacks  "coping skills to manage his depression symptoms. The patient does not report any thoughts or means of suicide or homicide. The patient reports he is currently on Lexapro as prescribed by his primary care provider and reports the medication is beneficial in stabilizing his mood.  Dimension IV Risk Ratin-The patient is motivated for treatment at this time.  The patient may be ambivalent about his disease or the need for change.  The patient now openly admits to the severe negative consequences brought on by his substance abuse.  Dimension V Risk Ratin-At this time the patient has a poor recognition and understanding of relapse and recidivism issues. The patient would benefit from continued coping skills and relapse prevention knowledge. The patient is to report any relapses he may have as soon as possible.  Patient reports vacuuming serves as his largest trigger, since he would clean while he was intoxicated.  He reports he is gaining insight into his triggers and learning alternate coping skills.  The patient's list of the ways substance abuse has had a negative impact was processed, and each negative impact was reinforced with him.  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  He reports that he regularly attends a mens support group, AA meetings, and has obtained a sponsor. The patient does have family and friends who are supportive of his recovery.  He reports he planned to propose to his fiance around the time of his DWI arrest, but reports the engagement plans are temporarily on hold until patient has more sobriety. The patient stated his court date for a DWI on 3/09/2017 went \"extremely well.\"  The patient reports he has gotten rid of all of his using friends and reports spending a lot of time with his brother who is supportive of his recovery. The patient reports that he has gotten ignition " "interlock and has his car back.   T) Client educated on Relapse. Client has completed 52 of 84 hours of program at this time. Projected discharge date is 5/8/17. Current discharge plan is Relapse Prevention.     KENJI May        Psycho-Educational Curriculum  Date Attended  Psycho-Educational Curriculum  Date Attended    Acceptance   Shame/Guilt     1st Step  2/27/2016 Anger/Rage     Affirmations  3/02/2017 Mental Health     Automatic Negative Thoughts  3/01/2017 Anxiety     Cross Addiction  3/01/217 Co-Occurring Disorders     Stages of Change  2/28/2017 Kylah/Bipolar     Relapse   Trauma      Addictive Thoughts  3/06/2017, 3/09/2017 Victim Identity     Coping Skills  3/08/2017 Sober Structure     Relapse Prevention  3/07/2017 Continuum of Care     Medical Aspects   Non-12 Step Support     Brain/Neurotransmitters   Priorities     Medication Compliance   Spirituality     TEE Alcohol/Drug Research   Weekend Planner       Balance 2/20     Goals 2/21     8 Dimensions of wellness 2/22     Bucket list 2/23   Physical Health   Educational Videos     Post Acute Withdrawal   1st Step     Pregnancy and Drug Use   2nd Step     Sexual Health   Assertive Communication     Short-Term/Long-Term Effects   My name is Celestine Osman Addiction     Assertive Communication   God As We Understood Him     Boundaries   HBO Relapse     Codependence    HBO What Is Addiction     Defense Mechanisms    Medical Aspects 1     Family Roles   Medical Aspects 2     Goodbye Letter   [employer] Geographic: Stress     Intimacy   PBS Depression Out of the Shadows     Needs/Dealbreakers in Relationships   The Anonymous People    Socialization Skills   [place]     Feelings   Ezekiel Mckeon \"Highjacked Brain\"    ABC Model of Emotion   Peter Gamez Humor in Tx    Grief and Loss   The Mindfulness Movie    Healthy vs. Unhealthy Feelings   Celestine HILL documentary     Meditation/Mindfulness  2/23/2017     Overconfidence/Complacency     "   Resentments       Stress

## 2021-06-09 NOTE — PROGRESS NOTES
Weekly Progress Note  Santino Cutler  1990  [id number]  D) Pt attended two groups this week with no absences. A) Staff facilitated groups and reviewed tx progress. Assessed for [facility]. R) No VAP needed at this time. Pt working on the following dimensions:  Dimension I Risk Ratin. No Concern. The patient described a pattern of increasing tolerance for the mood-altering substance, as he needed to use more of it to obtain the desired effect. At this time, the patient displays no withdrawal signs or symptoms and appears to be fully functioning. He reports that his last date of use was 17. 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  The patient reports ongoing abstinence since enrolling in programming.  Dimension II Risk Ratin.  No Concern. The patient reports his liver enzymes have normalized since he has abstained from drinking.  He continues to see his primary care doctor for any health-related issues, and for his recent diagnosis of diverticulitis.  Patient reports he has followed up with his primary provider and complains of no further symptoms associated with his diagnosis.  Dimension III Risk Ratin.  Mild Concern.  The patient reports difficulty with impulse control and lacks coping skills to manage his depression symptoms. The patient reports he is currently on Lexapro as prescribed by his primary care provider and reports the medication is beneficial in stabilizing his mood.  The patient reports his depression symptoms have lessened since he has abstained from alcohol.  Dimension IV Risk Ratin.  No Concern. The patient is motivated for treatment at this time.  The patient may be ambivalent about his disease or the need for change. The patient now openly admits to the severe negative consequences brought on by his substance abuse.  The patient reports attending Alcoholics Anonymous meetings and working with a sponsor are the most helpful ways he curbs cravings for alcohol.  Dimension  V Risk Ratin.  Moderate Concern. The patient share in group how he is building coping skills and learning relapse prevention tools. The patient's list of the ways substance abuse has had a negative impact was processed, and each negative impact was reinforced with him.  The patient shared his feelings that often trigger him to relapse and discussed resentments towards his girlfriend's family members.   Group members processed these experiences with the patient and feedback was provided.  The patient reports he often feels triggers after experiencing positive events, or when he lacks structure.  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  He reports that he regularly attends a men's support group, AA meetings, and has obtained a sponsor. The patient reports his family, friends, and girlfriend are supportive of his recovery.  The patient identified reports he is planning to pursue graduate school for counseling or a nurse practitioner program.  Writer has recommended to the patient that he gain more time in sobriety before making career decisions.  T) Client educated on Mental Health. Client has completed 82 of 84 hours of program at this time. Projected discharge date is 17. Current discharge plan is Relapse Prevention.     KENJI May        Psycho-Educational Curriculum  Date Attended  Psycho-Educational Curriculum  Date Attended    Acceptance   Shame/Guilt     1st Step  2016 Anger/Rage  3/27/2017   Affirmations  3/02/2017 Mental Health     Automatic Negative Thoughts  3/01/2017 Anxiety     Cross Addiction  3/01/217 Co-Occurring Disorders  2017   Stages of Change  2017 Kylah/Bipolar     Relapse   Trauma   2017   Addictive Thoughts  3/06/2017, 3/09/2017 Victim Identity     Coping Skills  3/08/2017 Sober Structure     Relapse Prevention  3/07/2017 Continuum of Care    "  Medical Aspects   Non-12 Step Support     Brain/Neurotransmitters  3/13/2017 Priorities     Medication Compliance  3/14/2017 Spirituality     TEE Alcohol/Drug Research  3/14/2017 Weekend Planner       Balance 2/20     Goals 2/21     8 Dimensions of wellness 2/22     Bucket list 2/23   Physical Health  3/15/2017 Educational Videos     Post Acute Withdrawal  3/17/2017 1st Step     Pregnancy and Drug Use  3/17/2017 2nd Step     Sexual Health  3/14/2017 Assertive Communication     Short-Term/Long-Term Effects  3/16/2017 My name is Celestine HILL    Relationships   Cross Addiction     Assertive Communication  3/20/2017 God As We Understood Him     Boundaries  3/20/2017 HBO Relapse     Codependence    HBO What Is Addiction     Defense Mechanisms    Medical Aspects 1     Family Roles  3/22/2017 Medical Aspects 2     Goodbye Letter  3/22/2017 [employer] Geographic: Stress     Intimacy   PBS Depression Out of the Shadows     Needs/Dealbreakers in Relationships   The [employer] People    Socialization Skills  3/20/2017 [place]     Feelings   Ezekiel Mckeon \"Highjacked Brain\"    ABC Model of Emotion   Peter Gamez Humor in [place]x    Grief and Loss  3/29/2017 The Mindfulness Movie    Healthy vs. Unhealthy Feelings   Celestine HILL documentary     Meditation/Mindfulness  2/23/2017,  3/29/2017     Overconfidence/Complacency       Resentments       Stress         "

## 2021-06-09 NOTE — PROGRESS NOTES
Santino Cutler attended 1 hours of group therapy today.  Patient complained of abdominal pain during the first hour of programming.  During break from programming, patient stated his abdominal pain worsened and he wanted to seek medical attention.  Writer suggested patient go to the ED immediately.  Patient stated he was unsure of where the ED was located so writer called security and they escorted him to the ED.    3/9/2017 8:26 PM Brunilda Engel

## 2021-06-09 NOTE — PROGRESS NOTES
Weekly Progress Note  Santino Cutler  1990  [id number]  Addendum  Transfer Note to Evening Outpatient     D) Pt attended 4 groups  this week with 0 absences. Client is transferring to the evening outpatient group beginning, 17 due to return to work. A) Staff facilitated groups and reviewed tx progress. Assessed for [facility]. R) No VAP needed at this time. Pt working on the following dimensions:  Dimension I Risk Ratin-At this time the patient displays no withdrawal signs or symptoms. He reports that his last date of use was 17. 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 At this time the patient appears to be fully functioning. The patient is to remain abstinent from all mood altering chemicals (TXPlan 1) The patient has not endorsed any use over the past 7 days.   Dimension II Risk Ratin-The patient does report that he has increased liver enzymes at this time, in the patients last check up the patient reports that the doctor told him that his liver is on the mend. The patient appeared optimistic about this and will continue to see his physician. He does report that he has a primary care physician which whom he sees regularly. At this time the patient is able to get the care he needs and is able to cope with any discomfort that he may be experiencing. The patient is to practice living a healthy lifestyle by getting proper nutrition, rest, and exercise (TXPlan 2)   Dimension III Risk Ratin-At this time the patient has difficulty with impulse control and lacks coping skills for his depression. The patient does not report any thoughts or means of suicide or homicide. The patient does have difficulty managing his emotions and behaviors. The patient reports he is currently on Lexapro as prescribed by his primary care provider. The patient is able to participate in treatment activities and function adequately in significant life areas.The patient appears to be stable  at this time.   Dimension IV Risk Ratin-The patient is motivated for treatment at this time. He does have some reinforcement as he has a pending DUI charge that will be in court for on . The patient may be ambivalent about his disease or the need for change. The patient reports that this is his first time in treatment although he has been told many times that he needed to go. The patient will be given an assignment packet with his written and oral assignments (TXPlan 4) The patient is a good participant in the group.  He may be going back to work on  and is wanting to go to the EO group, the patient was told that he needs to have a set schedule before he will be transferred.   Dimension V Risk Rating: 3-At this time the patient has a poor recognition and understanding of relapse and recidivism issues. The patient displays to be at a high risk for relapse as the longest period of sobriety he has had has been 10 days. The patient reported that during those 10 days most of it was spent detoxing. The patient would benefit from continued coping skills and relapse prevention knowledge. The patient is to report any relapses he may have as soon as possible (TXPlan 5) At this time the patient does not report any cravings, triggers, or urges to use over the last 7 days.   Dimension VI Risk Ratin-The patient is engaged in some structured activities, he reports that he attends mens groups, AA meetings, and does work. He is currently on a disability leave from work at this time to complete treatment. The patient does have family and friends who are supportive of his recovery, he also reports that he recently got a sponsor. The patient reports a pending DUI charge in which he will present to court on 3/9/17. The patient reports he has gotten rid of all of his using friends and reports spending a lot of time with his brother who is supportive of his recovery. The patient reports that he has gotten ignition  "interlock and has his car back.  The patient projected that he will be going back to work on 2/27/17.   T) Client educated on sober structure. Client has completed 31 of 84 hours of program at this time. Projected discharge date is 5/8/17. Current discharge plan is relapse prevention.     Miriam Rudolph, ADC-T        Psycho-Educational Curriculum  Date Attended  Psycho-Educational Curriculum  Date Attended    Acceptance   Shame/Guilt     1st Step   Anger/Rage     Affirmations   Mental Health     Automatic Negative Thoughts   Anxiety     Cross Addiction   Co-Occurring Disorders     Stages of Change   Kylah/Bipolar     Relapse   Trauma      Addictive Thoughts   Victim Identity     Coping Skills   Sober Structure     Relapse Prevention   Continuum of Care     Medical Aspects   Non-12 Step Support     Brain/Neurotransmitters   Priorities     Medication Compliance   Spirituality     TEE Alcohol/Drug Research   Weekend Planner       Balance 2/20     Goals 2/21     8 Dimensions of wellness 2/22     Bucket list 2/23   Physical Health   Educational Videos     Post Acute Withdrawal   1st Step     Pregnancy and Drug Use   2nd Step     Sexual Health   Assertive Communication     Short-Term/Long-Term Effects   My name is Celestine HILL    Relationships   Cross Addiction     Assertive Communication   God As We Understood Him     Boundaries   HBO Relapse     Codependence    HBO What Is Addiction     Defense Mechanisms    Medical Aspects 1     Family Roles   Medical Aspects 2     Goodbye Letter   [employer] Geographic: Stress     Intimacy   PBS Depression Out of the Shadows     Needs/Dealbreakers in Relationships   The Anonymous People    Socialization Skills   [place]     Feelings   Ezekiel Mckeon \"Highjacked Brain\"    ABC Model of Emotion   Peter Gamez Humor in Tx    Grief and Loss   The Mindfulness Movie    Healthy vs. Unhealthy Feelings   Celestine HILL documentary     Meditation/Mindfulness  2/23     Overconfidence/Complacency     "   Resentments       Stress

## 2021-06-09 NOTE — PROGRESS NOTES
Weekly Progress Note  Santino Cutler  1990  [id number]  Addendum  Transfer Note to Evening Outpatient     D) Pt attended four groups  this week with no absences. Patient transferred from [facility] to EOP on 17 due to his return to work. A) Staff facilitated groups and reviewed tx progress. Assessed for [facility]. R) No VAP needed at this time. Pt working on the following dimensions:  Dimension I Risk Ratin-At this time the patient displays no withdrawal signs or symptoms. He reports that his last date of use was 17. 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 At this time the patient appears to be fully functioning. The patient is to remain abstinent from all mood altering chemicals.  The patient reports ongoing abstinence.  Dimension II Risk Ratin-The patient does report that he has increased liver enzymes at this time, in the patients last check up the patient reports that the doctor told him that his liver is on the mend. The patient appeared optimistic about this and will continue to see his physician. He does report that he has a primary care physician which whom he sees regularly. At this time the patient is able to get the care he needs and is able to cope with any discomfort that he may be experiencing.  Dimension III Risk Ratin-The patient reports difficulty with impulse control and lacks coping skills to manage his depression symptoms. The patient does not report any thoughts or means of suicide or homicide. The patient reports he is currently on Lexapro as prescribed by his primary care provider and reports the medication is beneficial in stabilizing his mood.  Dimension IV Risk Ratin-The patient is motivated for treatment at this time. He does have some reinforcement as he has a pending DUI charge that will be in court for on . The patient may be ambivalent about his disease or the need for change.   Dimension V Risk Ratin-At this time the patient  has a poor recognition and understanding of relapse and recidivism issues. The patient would benefit from continued coping skills and relapse prevention knowledge. The patient is to report any relapses he may have as soon as possible.  Patient reports vacuuming serves as his largest trigger, since he would clean while he was intoxicated.  He reports he is gaining insight into his triggers and learning alternate coping skills.  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  He reports that he regularly attends a mens support group, AA meetings, and has obtained a sponsor. The patient does have family and friends who are supportive of his recovery.  He reports he planned to propose to his fiance around the time of his DWI arrest, but reports the engagement plans are temporarily on hold until patient has more sobriety. The patient reports a pending DUI charge in which he will present to court on 3/9/17. The patient reports he has gotten rid of all of his using friends and reports spending a lot of time with his brother who is supportive of his recovery. The patient reports that he has gotten ignition interlock and has his car back.   T) Client educated on Acceptance. Client has completed 43 of 84 hours of program at this time. Projected discharge date is 17. Current discharge plan is Relapse Prevention.     KENJI May        Psycho-Educational Curriculum  Date Attended  Psycho-Educational Curriculum  Date Attended    Acceptance   Shame/Guilt     1st Step  2016 Anger/Rage     Affirmations  3/02/2017 Mental Health     Automatic Negative Thoughts  3/01/2017 Anxiety     Cross Addiction  3/01/217 Co-Occurring Disorders     Stages of Change  2017 Kylah/Bipolar     Relapse   Trauma      Addictive Thoughts   Victim Identity     Coping Skills   Sober Structure     Relapse Prevention   Continuum of  "Care     Medical Aspects   Non-12 Step Support     Brain/Neurotransmitters   Priorities     Medication Compliance   Spirituality     TEE Alcohol/Drug Research   Weekend Planner       Balance 2/20     Goals 2/21     8 Dimensions of wellness 2/22     Bucket list 2/23   Physical Health   Educational Videos     Post Acute Withdrawal   1st Step     Pregnancy and Drug Use   2nd Step     Sexual Health   Assertive Communication     Short-Term/Long-Term Effects   My name is Celestine HILL    Relationships   Cross Addiction     Assertive Communication   God As We Understood Him     Boundaries   HBO Relapse     Codependence    HBO What Is Addiction     Defense Mechanisms    Medical Aspects 1     Family Roles   Medical Aspects 2     Goodbye Letter   [employer] Geographic: Stress     Intimacy   PBS Depression Out of the Shadows     Needs/Dealbreakers in Relationships   The Anonymous People    Socialization Skills   [place]     Feelings   Ezekiel Mckeon \"Highjacked Brain\"    ABC Model of Emotion   Peter Gamez Humor in [place]x    Grief and Loss   The Mindfulness Movie    Healthy vs. Unhealthy Feelings   Celestine HILL documentary     Meditation/Mindfulness  2/23     Overconfidence/Complacency       Resentments       Stress         "

## 2021-06-10 NOTE — PROGRESS NOTES
Weekly Progress Note  Santino Cutler  1990  [id number]  D) Pt attended one group this week with one excused absence. A) Staff facilitated groups and reviewed tx progress. [facility] for [facility]. R) No VAP needed at this time. Pt working on the following dimensions:  Dimension I Risk Ratin. No Concern. The patient described a pattern of increasing tolerance for the mood-altering substance, as he needed to use more of it to obtain the desired effect. At this time, the patient displays no withdrawal signs or symptoms and appears to be fully functioning.  He reports that his last date of use was 17. 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  The patient reports ongoing abstinence since enrolling in programming.  Dimension II Risk Ratin.  No Concern. The patient reports his liver enzymes have normalized since he has abstained from drinking.  He continues to see his primary care doctor for any health-related issues, and for his recent diagnosis of diverticulitis.  Patient reports he has followed up with his primary provider and complains of no further symptoms associated with his diagnosis.  Dimension III Risk Ratin.  Mild Concern.  The patient reports difficulty with impulse control and lacks coping skills to manage his depression symptoms. The patient reports he discontinued his Lexapro, under the care of his physician, since he depression symptoms have dramatically improved.  The patient reported he sought out medication management after his daily drinking caused depression symptoms.  He reported a dramatic decrease in depression symptoms since abstaining from alcohol use.  The patient inquired in group about the possibility of a Naltrexone prescription to curb cravings.  The patient was redirected to his provider to discuss the possibility of medication.  Dimension IV Risk Ratin.  No Concern. The patient is motivated for treatment at this time.  The patient previously displayed  "ambivalence about his disease or the need for change. The patient now openly admits to the severe negative consequences brought on by his substance abuse.  The patient was reinforced for any statement that reflected acceptance of his chemical dependence and acknowledgment of the destructive consequences that it has had on his life.  Dimension V Risk Ratin.  Moderate Concern. The patient shared in group how he is building coping skills and learning relapse prevention tools.  The patient reports he often feels triggers after experiencing positive events, or when he lacks structure.   He reports increasing sober support meetings and working with his sponsor are beneficial coping skills.  Patient did report a mild concerns that his sponsor has approximately six months of sobriety.   Group members processed these thoughts with the patient and feedback was provided.  Patient and writer met on 2017 for an individual counseling session to discuss aftercare planning and relapse prevention resources.  Dimension VI Risk Ratin.  No Concern. 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  He reports that he regularly attends a men's support group, AA meetings, and has obtained a sponsor.  The patient has been placed on two year administrative probation as a result of his recent DWI conviction. The patient reports his family, friends, and girlfriend are supportive of his recovery.  The patient reported in group on 2017 that his manager has removed him from a performance improvement plan and approved him to work independently.  Patient reported in group that a part of his \"recreation to prevent relapse\" plan is biking outdoors.  T) Client educated on Relapse. Client has completed 97 of 84 hours of program at this time. Projected discharge date from EO is 17.  Patient will begin Relapse Prevention on 2017.    Brunilda Engel, " "[facility]        Psycho-Educational Curriculum  Date Attended  Psycho-Educational Curriculum  Date Attended    Acceptance   Shame/Guilt     1st Step  2/27/2016 Anger/Rage  3/27/2017   Affirmations  3/02/2017,  4/19/2017 Mental Health     Automatic Negative Thoughts  3/01/2017 Anxiety     Cross Addiction  3/01/217,  4/19/2017 Co-Occurring Disorders  4/04/2017   Stages of Change  2/28/2017 Kylah/Bipolar     Relapse   Trauma   4/05/2017   Addictive Thoughts  3/06/2017, 3/09/2017,  4/26/2017 Victim Identity     Coping Skills  3/08/2017,  4/24/2017 Sober Structure     Relapse Prevention  3/07/2017 Continuum of Care  4/10/2017   Medical Aspects   Non-12 Step Support     Brain/Neurotransmitters  3/13/2017 Priorities  4/12/2017   Medication Compliance  3/14/2017 Spirituality  4/12/2017   TEE Alcohol/Drug Research  3/14/2017 Weekend Planner       Balance 2/20     Goals 2/21     8 Dimensions of wellness 2/22     Bucket list 2/23   Physical Health  3/15/2017 Educational Videos     Post Acute Withdrawal  3/17/2017 1st Step     Pregnancy and Drug Use  3/17/2017 2nd Step     Sexual Health  3/14/2017 Assertive Communication     Short-Term/Long-Term Effects  3/16/2017 My name is Celestine HILL    Relationships   Cross Addiction     Assertive Communication  3/20/2017 God As We Understood Him     Boundaries  3/20/2017 HBO Relapse     Codependence    HBO What Is Addiction     Defense Mechanisms    Medical Aspects 1     Family Roles  3/22/2017 Medical Aspects 2     Goodbye Letter  3/22/2017 [employer]: Stress     Intimacy   PBS Depression Out of the Shadows     Needs/Dealbreakers in Relationships   [employer] People    Socialization Skills  3/20/2017 [place]     Feelings   Ezekiel Mckeon \"Highjacked Brain\"    ABC Model of Emotion   Peter Gamez Humor in Tx    Grief and Loss  3/29/2017 The Mindfulness Movie    Healthy vs. Unhealthy Feelings   Celestine HILL documentary     Meditation/Mindfulness  2/23/2017,  3/29/2017   "   Overconfidence/Complacency       Resentments       Stress

## 2021-06-10 NOTE — PROGRESS NOTES
Weekly Progress Note  Santino Cutler  1990  [id number]  D) Pt attended one group this week with one excused absence. A) Staff facilitated groups and reviewed tx progress. [facility]essed for [facility]. R) No VAP needed at this time. Pt working on the following dimensions:  Dimension I Risk Ratin. No Concern. The patient described a pattern of increasing tolerance for the mood-altering substance, as he needed to use more of it to obtain the desired effect. At this time, the patient displays no withdrawal signs or symptoms and appears to be fully functioning. He reports that his last date of use was 17. 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  The patient reports ongoing abstinence since enrolling in programming.  Dimension II Risk Ratin.  No Concern. The patient reports his liver enzymes have normalized since he has abstained from drinking.  He continues to see his primary care doctor for any health-related issues, and for his recent diagnosis of diverticulitis.  Patient reports he has followed up with his primary provider and complains of no further symptoms associated with his diagnosis.  Dimension III Risk Ratin.  Mild Concern.  The patient reports difficulty with impulse control and lacks coping skills to manage his depression symptoms. The patient reports he is currently on Lexapro as prescribed by his primary care provider and reports the medication is beneficial in stabilizing his mood.  The patient reports his depression symptoms have lessened since he has abstained from alcohol.  Dimension IV Risk Ratin.  No Concern. The patient is motivated for treatment at this time.  The patient previously displayed ambivalence about his disease or the need for change. The patient now openly admits to the severe negative consequences brought on by his substance abuse.   Dimension V Risk Ratin.  Moderate Concern. The patient share in group how he is building coping skills and learning relapse  prevention tools. The patient's list of the ways substance abuse has had a negative impact was processed, and each negative impact was reinforced with him.  The patient shared his feelings that often trigger him to relapse and discussed resentments towards his girlfriend's family members.   Group members processed these experiences with the patient and feedback was provided.  The patient reports he often feels triggers after experiencing positive events, or when he lacks structure.   The patient reports recent stressors such as his work environment, his girlfriend's family, and recent probation obligations.  He reports increasing sober support meetings and working with his sponsor are beneficial coping skills.  Dimension VI Risk Ratin.  Mild Concern. 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  He reports that he regularly attends a men's support group, AA meetings, and has obtained a sponsor.  The patient has been placed on administrative probation as a result of his recent DWI conviction. The patient reports his family, friends, and girlfriend are supportive of his recovery.  The patient identified reports he is planning to pursue graduate school for counseling or a nurse practitioner program.  Writer has recommended to the patient that he gain more time in sobriety before making career decisions.  The patient shared in group that he felt sadness and frustration due to his recent performance review at work.  He stated he has been placed on a performance improvement plan to address his work execution.  The patient attended a group orientation for [facility], where he will be supervised for the duration of two years.  T) Client educated on Sober Support. Client has completed 91 of 84 hours of program at this time. Projected discharge date is 17. Current discharge plan is Acceptance.    Brunilda Engel,  "[facility]        Psycho-Educational Curriculum  Date Attended  Psycho-Educational Curriculum  Date Attended    Acceptance   Shame/Guilt     1st Step  2/27/2016 Anger/Rage  3/27/2017   Affirmations  3/02/2017,  4/19/2017 Mental Health     Automatic Negative Thoughts  3/01/2017 Anxiety     Cross Addiction  3/01/217,  4/19/2017 Co-Occurring Disorders  4/04/2017   Stages of Change  2/28/2017 Kylah/Bipolar     Relapse   Trauma   4/05/2017   Addictive Thoughts  3/06/2017, 3/09/2017 Victim Identity     Coping Skills  3/08/2017 Sober Structure     Relapse Prevention  3/07/2017 Continuum of Care  4/10/2017   Medical Aspects   Non-12 Step Support     Brain/Neurotransmitters  3/13/2017 Priorities  4/12/2017   Medication Compliance  3/14/2017 Spirituality  4/12/2017   TEE Alcohol/Drug Research  3/14/2017 Weekend Planner       Balance 2/20     Goals 2/21     8 Dimensions of wellness 2/22     Bucket list 2/23   Physical Health  3/15/2017 Educational Videos     Post Acute Withdrawal  3/17/2017 1st Step     Pregnancy and Drug Use  3/17/2017 2nd Step     Sexual Health  3/14/2017 Assertive Communication     Short-Term/Long-Term Effects  3/16/2017 My name is Celestine LANGEOlinda    Relationships   Cross Addiction     Assertive Communication  3/20/2017 God As We Understood Him     Boundaries  3/20/2017 HBO Relapse     Codependence    HBO What Is Addiction     Defense Mechanisms    Medical Aspects 1     Family Roles  3/22/2017 Medical Aspects 2     Goodbye Letter  3/22/2017 [employer]: Stress     Intimacy   PBS Depression Out of the Shadows     Needs/Dealbreakers in Relationships   The Anonymous People    Socialization Skills  3/20/2017 [place]     Feelings   Ezekiel Mckeon \"Highjacked Brain\"    ABC Model of Emotion   Peter Gamez Humor in Tx    Grief and Loss  3/29/2017 The Mindfulness Movie    Healthy vs. Unhealthy Feelings   Celestine HILL documentary     Meditation/Mindfulness  2/23/2017,  3/29/2017     Overconfidence/Complacency       Resentments  "      Stress

## 2021-06-10 NOTE — PROGRESS NOTES
Weekly Progress Note  Santino Cutler  1990  [id number]  D) Pt attended two groups this week with no absences.  The patient has successfully completed phase one and two of outpatient programming and will begin Relapse Prevention Group, under the direction of Bill Welander, LADC. A) Staff facilitated groups and reviewed tx progress. Assessed for [facility]. R) No VAP needed at this time. Pt working on the following dimensions:  Dimension I Risk Ratin. No Concern. The patient described a pattern of increasing tolerance for the mood-altering substance, as he needed to use more of it to obtain the desired effect. At this time, the patient displays no withdrawal signs or symptoms and appears to be fully functioning.  He reports that his last date of use was 17. 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  The patient reports ongoing abstinence since enrolling in programming.  Dimension II Risk Ratin.  No Concern. The patient reports his liver enzymes have normalized since he has abstained from drinking.  He continues to see his primary care doctor for any health-related issues, and for his recent diagnosis of diverticulitis.  Patient reports he has followed up with his primary provider and complains of no further symptoms associated with his diagnosis. The patient shared during Medical Aspects week that his health has dramatically improved.  He reports healthy weight gain from an increase in food intake and regular exercise at his local gym.  Dimension III Risk Ratin.  Mild Concern.  The patient reports difficulty with impulse control and lacks coping skills to manage his depression symptoms. The patient reports he discontinued his Lexapro, under the care of his physician, since he depression symptoms have dramatically improved.  The patient reported he sought out medication management after his daily drinking caused depression symptoms.  He reported a dramatic decrease in depression symptoms  "since abstaining from alcohol use.    Dimension IV Risk Ratin.  No Concern. The patient is motivated for treatment at this time.  The patient previously displayed ambivalence about his disease or the need for change. The patient now openly admits to the severe negative consequences brought on by his substance abuse.  The patient was reinforced for any statement that reflected acceptance of his chemical dependence and acknowledgment of the destructive consequences that it has had on his life.  Dimension V Risk Ratin.  Moderate Concern. The patient shared in group how he is building coping skills and learning relapse prevention tools.  The patient reports he often feels triggers after experiencing positive events, or when he lacks structure.   He reports increasing sober support meetings and working with his sponsor are beneficial coping skills.  Patient did report a mild concerns that his sponsor has approximately six months of sobriety.   Group members processed these thoughts with the patient and feedback was provided.  Patient and writer met on 2017 for an individual counseling session to discuss aftercare planning and relapse prevention resources.  Dimension VI Risk Ratin.  No Concern. 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  He reports that he regularly attends a men's support group, AA meetings, and has obtained a sponsor.  The patient has been placed on two year administrative probation as a result of his recent DWI conviction. The patient reports his family, friends, and girlfriend are supportive of his recovery.  The patient reported in group on 2017 that his manager has removed him from a performance improvement plan and approved him to work independently.  Patient reported in group that a part of his \"recreation to prevent relapse\" plan is biking outdoors.  T) Client educated on Medical Aspects. Client has " "completed 104 of 84 hours of program at this time. Projected discharge date from EO is 5/03/17.  Patient will begin Relapse Prevention on 5/11/2017.    Brunilda Engel [facility]        Psycho-Educational Curriculum  Date Attended  Psycho-Educational Curriculum  Date Attended    Acceptance   Shame/Guilt     1st Step  2/27/2016 Anger/Rage  3/27/2017   Affirmations  3/02/2017,  4/19/2017 Mental Health     Automatic Negative Thoughts  3/01/2017 Anxiety     Cross Addiction  3/01/217,  4/19/2017 Co-Occurring Disorders  4/04/2017   Stages of Change  2/28/2017 Kylah/Bipolar     Relapse   Trauma   4/05/2017   Addictive Thoughts  3/06/2017, 3/09/2017,  4/26/2017 Victim Identity     Coping Skills  3/08/2017,  4/24/2017 Sober Structure     Relapse Prevention  3/07/2017 Continuum of Care  4/10/2017   Medical Aspects   Non-12 Step Support     Brain/Neurotransmitters  3/13/2017,  5/01/2017 Priorities  4/12/2017   Medication Compliance  3/14/2017, 5/03/2017 Spirituality  4/12/2017   TEE Alcohol/Drug Research  3/14/2017 Weekend Planner       Balance 2/20     Goals 2/21     8 Dimensions of wellness 2/22     Bucket list 2/23   Physical Health  3/15/2017 Educational Videos     Post Acute Withdrawal  3/17/2017 1st Step     Pregnancy and Drug Use  3/17/2017 2nd Step     Sexual Health  3/14/2017 Assertive Communication     Short-Term/Long-Term Effects  3/16/2017 My name is Celestine Blanton   Cross Addiction     Assertive Communication  3/20/2017 God As We Understood Him     Boundaries  3/20/2017 HBO Relapse     Codependence    HBO What Is Addiction     Defense Mechanisms    Medical Aspects 1     Family Roles  3/22/2017 Medical Aspects 2     Goodbye Letter  3/22/2017 [employer]: Stress     Intimacy   PBS Depression Out of the Shadows     Needs/Dealbreakers in Relationships   The Anonymous People    Socialization Skills  3/20/2017 [place]     Feelings   Ezekiel Mckeon \"Highjacked Brain\"    ABC Model of Emotion   Peter Gamez " Humor in Tx    Grief and Loss  3/29/2017 The Mindfulness Movie    Healthy vs. Unhealthy Feelings   Celestine HILL documentary     Meditation/Mindfulness  2/23/2017,  3/29/2017     Overconfidence/Complacency       Resentments       Stress

## 2021-06-10 NOTE — PROGRESS NOTES
Weekly Progress Note  Santino Cutler  1990  [id number]      D) Pt attended 1 groups  this week with 0 absences. A) Staff facilitated groups and reviewed tx progress. Assessed for [facility]. R) No VAP needed at this time. Pt working on the following dimensions:    Dimension #1 - Withdrawal Potential - Risk 0. No concern.  Pt reports that his last date of use was 1/23/17. He reports a previous history of seizures, requiring medical attention, related to ETOH withdrawal. The patient reports ongoing abstinence since enrolling in programming.  Dimension #2 - Biomedical - Risk 0. No concern  Pt neither reports nor presents with biomedical conditions that would be a barrier to treatment.  Dimension #3 - Emotional/Behavioral/Cognitive - Risk 1. Mild concern.  Pt reports a diagnosis of Depression that he was previously managing with Lexapro. Pt reports symptoms of depression have improved with alcohol abstinence and he has discontinued off his psychotropic medication under the supervision of his primary care physician.  Dimension #4 - Treatment Acceptance/Resistance - Risk 0. No concern  Pt reports a strong desire to live a sober lifestyle and meet all treatment expectations. Pt does appear to be following all recommendations to build sober support.   Dimension #5 - Relapse Potential - Risk 2. Moderate concern  Pt remains vulnerable to relapse due to history of return to use and coping skills are just developing.   Dimension #6 - Recovery Environment - Risk 1. 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 The patient previously struggled with his work performance, but reports he is successfully working independently and has exceeded his manager's expectations. He reports that he regularly attends a men's support group, AA meetings, and works with a sponsor. The patient has been placed on two year administrative probation as a result of his recent  "DWI conviction. The patient reports his family, friends, and girlfriend are supportive of his recovery.     T) Patient educated on Sober Support.  Patient has completed 104 hours of IOP, and 6 hours of Relapse Prevention.  Projected discharge date is 8/25/17. Current discharge plan is TBD.     Donald Welander, LADC        Psycho-Educational Curriculum  Date Attended  Psycho-Educational Curriculum  Date Attended    Acceptance   Shame/Guilt     1st Step   Anger/Rage     Affirmations   Mental Health     Automatic Negative Thoughts   Anxiety     Cross Addiction   Co-Occurring Disorders     Stages of Change   Kylah/Bipolar     Relapse   Trauma      Addictive Thoughts   Victim Identity     Coping Skills   Sober Structure     Relapse Prevention   Continuum of Care     Medical Aspects   Non-12 Step Support     Brain/Neurotransmitters   Priorities     Medication Compliance   Spirituality     TEE Alcohol/Drug Research   Weekend Planner     Physical Health   Educational Videos     Post Acute Withdrawal   1st Step     Pregnancy and Drug Use   2nd Step     Sexual Health   Assertive Communication     Short-Term/Long-Term Effects   My name is Celestine HILL    Relationships   Cross Addiction     Assertive Communication   God As We Understood Him     Boundaries   HBO Relapse     Codependence    HBO What Is Addiction     Defense Mechanisms    Medical Aspects 1     Family Roles   Medical Aspects 2     Goodbye Letter   [employer]: Stress     Intimacy   PBS Depression Out of the Shadows     Needs/Dealbreakers in Relationships   The Anonymous People    Socialization Skills   [place]     Feelings   Ezekiel Mckeon \"Highjacked Brain\"    ABC Model of Emotion   Peter Gamez Humor in Tx    Grief and Loss   The Mindfulness Movie    Healthy vs. Unhealthy Feelings   Celestine HILL documentary     Meditation/Mindfulness       Overconfidence/Complacency       Resentments       Stress           "

## 2021-06-10 NOTE — PROGRESS NOTES
Weekly Progress Note  Santino Cutler  1990  [id number]  D) Pt attended two groups this week with no absences. A) Staff facilitated groups and reviewed tx progress. [facility] for [facility]. R) No VAP needed at this time. Pt working on the following dimensions:  Dimension I Risk Ratin. No Concern. The patient described a pattern of increasing tolerance for the mood-altering substance, as he needed to use more of it to obtain the desired effect. At this time, the patient displays no withdrawal signs or symptoms and appears to be fully functioning. He reports that his last date of use was 17. 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  The patient reports ongoing abstinence since enrolling in programming.  Dimension II Risk Ratin.  No Concern. The patient reports his liver enzymes have normalized since he has abstained from drinking.  He continues to see his primary care doctor for any health-related issues, and for his recent diagnosis of diverticulitis.  Patient reports he has followed up with his primary provider and complains of no further symptoms associated with his diagnosis.  Dimension III Risk Ratin.  Mild Concern.  The patient reports difficulty with impulse control and lacks coping skills to manage his depression symptoms. The patient reports he is currently on Lexapro as prescribed by his primary care provider and reports the medication is beneficial in stabilizing his mood.  The patient reports his depression symptoms have lessened since he has abstained from alcohol.  Dimension IV Risk Ratin.  No Concern. The patient is motivated for treatment at this time.  The patient previously displayed ambivalence about his disease or the need for change. The patient now openly admits to the severe negative consequences brought on by his substance abuse.  The patient reports attending Alcoholics Anonymous meetings and working with a sponsor are the most helpful ways he curbs cravings for  alcohol.  Dimension V Risk Ratin.  Moderate Concern. The patient share in group how he is building coping skills and learning relapse prevention tools. The patient's list of the ways substance abuse has had a negative impact was processed, and each negative impact was reinforced with him.  The patient shared his feelings that often trigger him to relapse and discussed resentments towards his girlfriend's family members.   Group members processed these experiences with the patient and feedback was provided.  The patient reports he often feels triggers after experiencing positive events, or when he lacks structure.  Dimension VI Risk Ratin.  Mild Concern. 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  He reports that he regularly attends a men's support group, AA meetings, and has obtained a sponsor.  The patient has been placed on administrative probation as a result of his recent DWI conviction. The patient reports his family, friends, and girlfriend are supportive of his recovery.  The patient identified reports he is planning to pursue graduate school for counseling or a nurse practitioner program.  Writer has recommended to the patient that he gain more time in sobriety before making career decisions.  The patient shared in group that he felt sadness and frustration due to his recent performance review at work.  He stated he has been placed on a performance improvement plan to address his work execution.  T) Client educated on Sober Support. Client has completed 88 of 84 hours of program at this time. Projected discharge date is 17. Current discharge plan is Relapse Prevention.     Brunilda Engel [facility]        Psycho-Educational Curriculum  Date Attended  Psycho-Educational Curriculum  Date Attended    Acceptance   Shame/Guilt     1st Step  2016 Anger/Rage  3/27/2017   Affirmations  3/02/2017 Mental Health    "  Automatic Negative Thoughts  3/01/2017 Anxiety     Cross Addiction  3/01/217 Co-Occurring Disorders  4/04/2017   Stages of Change  2/28/2017 Kylah/Bipolar     Relapse   Trauma   4/05/2017   Addictive Thoughts  3/06/2017, 3/09/2017 Victim Identity     Coping Skills  3/08/2017 Sober Structure     Relapse Prevention  3/07/2017 Continuum of Care  4/10/2017   Medical Aspects   Non-12 Step Support     Brain/Neurotransmitters  3/13/2017 Priorities  4/12/2017   Medication Compliance  3/14/2017 Spirituality  4/12/2017   TEE Alcohol/Drug Research  3/14/2017 Weekend Planner       Balance 2/20     Goals 2/21     8 Dimensions of wellness 2/22     Bucket list 2/23   Physical Health  3/15/2017 Educational Videos     Post Acute Withdrawal  3/17/2017 1st Step     Pregnancy and Drug Use  3/17/2017 2nd Step     Sexual Health  3/14/2017 Assertive Communication     Short-Term/Long-Term Effects  3/16/2017 My name is Celestine HILL    Relationships   Cross Addiction     Assertive Communication  3/20/2017 God As We Understood Him     Boundaries  3/20/2017 HBO Relapse     Codependence    HBO What Is Addiction     Defense Mechanisms    Medical Aspects 1     Family Roles  3/22/2017 Medical Aspects 2     Goodbye Letter  3/22/2017 National Geographic: Stress     Intimacy   PBS Depression Out of the Shadows     Needs/Dealbreakers in Relationships   The [place] People    Socialization Skills  3/20/2017 [place]     Feelings   Ezekiel Mckeon \"Highjacked Brain\"    ABC Model of Emotion   Peter Gamez Humor in Tx    Grief and Loss  3/29/2017 The Mindfulness Movie    Healthy vs. Unhealthy Feelings   Celestine HILL documentary     Meditation/Mindfulness  2/23/2017,  3/29/2017     Overconfidence/Complacency       Resentments       Stress         "

## 2021-06-10 NOTE — PROGRESS NOTES
[facility]   Mental Health and Addiction Care   Psychiatric, [facility], and [facility]    [phone] or [phone]   Relapse Prevention Plan     Client Name:  Santino Cutler   MRN: [id number]    Counselor: Donald Welander, LADC       Title:  Dimension 1 Withdrawal Potential, Risk level 0, no concern   Plan Date:   5/11/2017   Diagnosis:  Severe Alcohol Use Disorder, F10.20     Problem:Pt reports that his last date of use was 1/23/17. 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 The patient reports ongoing abstinence since enrolling in programming.  Goal: Begin Date: 5/11/17 Target Date: 8/11/17  Remain clean and sober throughout Relapse Prevention group in order to avoid experiencing withdrawal symptoms and to meet group expectations.     Method 1: Begin Date:5/11/17 Target Date: 8/11/17 Date Completed:   Maintain abstinence while attending Relapse Prevention as a way of gaining awareness of your thoughts, feelings and aspirations for recovery. Report any relapses, if any, on any substances of abuse to staff immediately.        Title:  Dimension 2 Biomedical condition, Risk level 0, no concern   Plan Date:   5/11/2017   Diagnosis:  Severe Alcohol Use Disorder, F10.20   Problem:Pt neither reports nor presents with biomedical conditions that would be a barrier to treatment.    Goal: Begin Date: 5/11/17 Target Date: 8/11/17  Continue to develop healthy habits that enhance your recovery lifestyle.     Method 1: Begin Date:5/11/17 Target Date: 8/11/17 Date Completed:   Get proper rest, nutrition, and exercise in order to promote good brain and body function. Inform staff immediately of any changes in your health that may affect your active participation in group therapy or attendance.    Is Nicotine use indicated on the assessment? yes  Method 2: Begin Date:5/11/17 Target Date: 8/11/17 Date Completed:   Staff to provide Pt with nicotine cessation information and help on how to quit use. Pt to  report progress on stopping nicotine use to staff.        Title: Dimension 3, Emotional, behavioral, cognitive condition, Risk level 1, mild concern   Plan Date:   5/11/2017   Diagnosis:  Severe Alcohol Use Disorder, F10.20   Problem: Pt reports diagnosed depression that he was managing with Lexapro. Pt reports symptoms of depression have improved with alcohol abstinence.     Goal: Begin Date: 5/11/17 Target Date: 8/11/17   To treat mental health effectively while attending aftercare in order to increase your ability to meet goals and treatment expectations.   Method 1: Begin Date:5/11/17 Target Date: 8/11/17  Report to staff any changes in your mental health that may affect your attendance or participation in group therapy.   Date Completed:        Title: Dimension 4, Treatment Acceptance/Resistance, Risk level 0, no concern   Plan Date:   5/11/2017   Diagnosis:  Severe Alcohol Use Disorder, F10.20   Problem:Pt reports a strong desire to live a sober lifestyle and meet all treatment expectations.     Goal: Begin Date: 5/11/17 Target Date: 8/11/17  To follow through with intentions to treat chemical dependency concerns while meeting Relapse Prevention group expectations in order to graduate successfully from our program.     Method 1: Begin Date:5/11/17 Target Date: 8/11/17 Date Completed:   Attend Relapse Prevention groups as directed and share thoughts, feelings and urges to use, as well as sober supports with staff and peers in order to maintain awareness of details shaping your recovery process.       Title: Dimension 5, Relapse potential, Risk level 2, moderate concern   Plan Date:   5/11/2017   Diagnosis:  Severe Alcohol Use Disorder, F10.20   Problem:Pt remains vulnerable to relapse due to history of return to use and coping skills are just developing.     Goal: Begin Date: 5/11/17Target Date: 8/11/17  To deal effectively with relapse triggers and stressors while building coping skills in order to handle life  events without resorting to drug/alcohol use.     Method 1: Begin Date:5/11/17 Target Date: 8/11/17  Date Completed:   Share the sober living skills you have learned with other group members in order to help them in their recovery. Continue to improve upon your recovery skills and share how you are able to manage urges and triggers to return to use.        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 He reports that he regularly attends a men's support group, AA meetings, and has obtained a sponsor. The patient has been placed on two year administrative probation as a result of his recent DWI conviction. The patient reports his family, friends, and girlfriend are supportive of his recovery.     Goal: Begin Date: 5/11/17 Target Date: 8/11/17  To build meaningful structure into your weekly schedule by attending specific recovery activities on a daily basis     Method 1: Begin Date:5/11/17 Target Date: 8/11/17 Date Completed:   Find 2 sober support groups you feel comfortable attending on a weekly basis and inform counselor how these meetings are impacting you.   Method 2: Begin Date:5/11/17 Target Date: 8/11/17  Date Completed:   Find a sponsor that will enhance your recovery and help build your sober support network.       By signing this document, I am acknowledging that I was actively and directly involved in the development of my treatment plan. I have accepted my referral to the Relapse Prevention program and will attend  group sessions as directed by staff.         Client Signature_________________________________________         Date__________________         Staff Signature   Donald Welander, LADC,

## 2021-06-10 NOTE — PROGRESS NOTES
Weekly Progress Note  Santino Cutler  1990  [id number]      D) Pt attended 1 groups  this week with 0 absences. A) Staff facilitated groups and reviewed tx progress. [facility] for [facility]. R) No VAP needed at this time. Pt working on the following dimensions:    Dimension #1 - Withdrawal Potential - Risk 0. No concern.  Pt reports that his last date of use was 1/23/17. 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 The patient reports ongoing abstinence since enrolling in programming.  Dimension #2 - Biomedical - Risk 0. No concern  Pt neither reports nor presents with biomedical conditions that would be a barrier to treatment.  Dimension #3 - Emotional/Behavioral/Cognitive - Risk 1. Mild concern.  Pt reports diagnosed depression that he was managing with Lexapro. Pt reports symptoms of depression have improved with alcohol abstinence.   Dimension #4 - Treatment Acceptance/Resistance - Risk 0. No concern  Pt reports a strong desire to live a sober lifestyle and meet all treatment expectations.   Dimension #5 - Relapse Potential - Risk 2. Moderate concern  Pt remains vulnerable to relapse due to history of return to use and coping skills are just developing.   Dimension #6 - Recovery Environment - Risk 1. 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 He reports that he regularly attends a men's support group, AA meetings, and has obtained a sponsor. The patient has been placed on two year administrative probation as a result of his recent DWI conviction. The patient reports his family, friends, and girlfriend are supportive of his recovery.     T) Patient educated on Sobriety Number One. Patient has completed 104 hours of IOP, and 2 hours of RP.  Projected discharge date is 8/25/17. Current discharge plan is TBD.     Donald Welander, [facility] HospitalLJ        Psycho-Educational Curriculum  Date Attended  Psycho-Educational Curriculum  Date  "Attended    Acceptance   Shame/Guilt     1st Step   Anger/Rage     Affirmations   Mental Health     Automatic Negative Thoughts   Anxiety     Cross Addiction   Co-Occurring Disorders     Stages of Change   Kylah/Bipolar     Relapse   Trauma      Addictive Thoughts   Victim Identity     Coping Skills   Sober Structure     Relapse Prevention   Continuum of Care     Medical Aspects   Non-12 Step Support     Brain/Neurotransmitters   Priorities     Medication Compliance   Spirituality     TEE Alcohol/Drug Research   Weekend Planner     Physical Health   Educational Videos     Post Acute Withdrawal   1st Step     Pregnancy and Drug Use   2nd Step     Sexual Health   Assertive Communication     Short-Term/Long-Term Effects   My name is Celestine HILL    Relationships   Cross Addiction     Assertive Communication   God As We Understood Him     Boundaries   HBO Relapse     Codependence    HBO What Is Addiction     Defense Mechanisms    Medical Aspects 1     Family Roles   Medical Aspects 2     Goodbye Letter   [employer] Geographic: Stress     Intimacy   PBS Depression Out of the Shadows     Needs/Dealbreakers in Relationships   The Anonymous People    Socialization Skills   [place]     Feelings   Ezekiel Mckeon \"Highjacked Brain\"    ABC Model of Emotion   Peter Gamez Humor in [place]    Grief and Loss   The Mindfulness Movie    Healthy vs. Unhealthy Feelings   Celestine HILL documentary     Meditation/Mindfulness       Overconfidence/Complacency       Resentments       Stress           "

## 2021-06-10 NOTE — PROGRESS NOTES
"Santino Cutler attended 1 hours of individual therapy today.    Writer and patient met to discuss patient's upcoming transition to the Relapse Prevention group.  Writer also gave patient an assignment called \"Self-help for stress\" to complete and present next week in group.  Patient has previously reported that his girlfriend's family does not accept him and seems to enjoy pointing out patient's interlock system and whiskey plates at gatherings.  Writer suggested patient begin individual therapy to discuss these stressful situations.  Patient stated he has discontinued his prescription anti-depressant Lexapro (with his doctor's approval and guidance).  He stated the medication was not helpful and his depression symptoms were caused from alcohol withdrawal.  The patient reported several hospitalizations and several ED visits for his withdrawal symptoms and reports he previously experienced about six alcohol-related withdrawal seizures.  Patient reports the outpatient treatment programming has been life changing and he feels confident that he can be successful in his recovery.  Patient attends on a weekly basis several AA meetings, a mens group, and Spiritism.  Patient also actively works with a sponsor.    4/26/2017 7:01 PM Brunilda Engel  "

## 2021-06-10 NOTE — PROGRESS NOTES
Weekly Progress Note  Santino Cutler  1990  [id number]      D) Pt attended 1 groups  this week with 0 absences. A) Staff facilitated groups and reviewed tx progress. [facility] for [facility]. R) No VAP needed at this time. Pt working on the following dimensions:    Dimension #1 - Withdrawal Potential - Risk 0. No concern.  Pt reports that his last date of use was 1/23/17. He reports a previous history of seizures, requiring medical attention, related to ETOH withdrawal. The patient reports ongoing abstinence since enrolling in programming.  Dimension #2 - Biomedical - Risk 0. No concern  Pt neither reports nor presents with biomedical conditions that would be a barrier to treatment.  Dimension #3 - Emotional/Behavioral/Cognitive - Risk 1. Mild concern.  Pt reports a diagnosis of Depression that he was previously managing with Lexapro. Pt reports symptoms of depression have improved with alcohol abstinence and he has discontinued off his psychotropic medication under the supervision of his primary care physician.  Dimension #4 - Treatment Acceptance/Resistance - Risk 0. No concern  Pt reports a strong desire to live a sober lifestyle and meet all treatment expectations.  Dimension #5 - Relapse Potential - Risk 2. Moderate concern  Pt remains vulnerable to relapse due to history of return to use and coping skills are just developing.   Dimension #6 - Recovery Environment - Risk 1. 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 The patient previously struggled with his work performance, but reports he is successfully working independently and has exceeded his manager's expectations. He reports that he regularly attends a men's support group, AA meetings, and works with a sponsor. The patient has been placed on two year administrative probation as a result of his recent DWI conviction. The patient reports his family, friends, and girlfriend are  "supportive of his recovery. The patient reported that he arranged a Mother's Day celebration for all of the women in his immediate and extended family.    T) Patient educated on Spirituality and Relapse Prevention tools and techniques. Patient has completed 104 hours of IOP, and 4 hours of Relapse Prevention.  Projected discharge date is 8/25/17. Current discharge plan is TBD.     KENJI May        Psycho-Educational Curriculum  Date Attended  Psycho-Educational Curriculum  Date Attended    Acceptance   Shame/Guilt     1st Step   Anger/Rage     Affirmations   Mental Health     Automatic Negative Thoughts   Anxiety     Cross Addiction   Co-Occurring Disorders     Stages of Change   Kylah/Bipolar     Relapse   Trauma      Addictive Thoughts   Victim Identity     Coping Skills   Sober Structure     Relapse Prevention   Continuum of Care     Medical Aspects   Non-12 Step Support     Brain/Neurotransmitters   Priorities     Medication Compliance   Spirituality     TEE Alcohol/Drug Research   Weekend Planner     Physical Health   Educational Videos     Post Acute Withdrawal   1st Step     Pregnancy and Drug Use   2nd Step     Sexual Health   Assertive Communication     Short-Term/Long-Term Effects   My name is Celestine HILL    Relationships   Cross Addiction     Assertive Communication   God As We Understood Him     Boundaries   HBO Relapse     Codependence    HBO What Is Addiction     Defense Mechanisms    Medical Aspects 1     Family Roles   Medical Aspects 2     Goodbye Letter   [employer] Geographic: Stress     Intimacy   PBS Depression Out of the Shadows     Needs/Dealbreakers in Relationships   The Anonymous People    Socialization Skills   [place]     Feelings   Ezekiel Mckeon \"Highjacked Brain\"    ABC Model of Emotion   Peter Gamez Humor in [place]    Grief and Loss   The Mindfulness Movie    Healthy vs. Unhealthy Feelings   Celestine HILL documentary     Meditation/Mindfulness       Overconfidence/Complacency     "   Resentments       Stress

## 2021-06-11 NOTE — PROGRESS NOTES
Weekly Progress Note  Santino Cutler  1990  [id number]      D) Pt attended 1 groups  this week with 0 absences. A) Staff facilitated groups and reviewed tx progress. [facility] for [facility]. R) No VAP needed at this time. Pt working on the following dimensions:    Dimension #1 - Withdrawal Potential - Risk 0. No concern.  Pt reports that his last date of use was 1/23/17. No concerns.   Dimension #2 - Biomedical - Risk 0. No concern  Pt neither reports nor presents with biomedical conditions that would be a barrier to treatment.  Dimension #3 - Emotional/Behavioral/Cognitive - Risk 1. Mild concern.  Pt reports a diagnosis of Depression that he was previously managing with Lexapro. Pt reports symptoms of depression have improved with alcohol abstinence and he has discontinued off his psychotropic medication under the supervision of his primary care physician. Pt reports he enjoyed canoeing with a friend over the weekend and he is finding more fun activities that do not involve substance use.   Dimension #4 - Treatment Acceptance/Resistance - Risk 0. No concern  Pt reports a strong desire to live a sober lifestyle and meet all treatment expectations. Pt does appear to be following all recommendations to build sober support.   Dimension #5 - Relapse Potential - Risk 2. Moderate concern  Pt remains vulnerable to relapse due to history of return to use and coping skills are just developing. Pt reported no urges or triggers this week.   Dimension #6 - Recovery Environment - Risk 1. 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 The patient reports work is really going well now.  He reports that he regularly attends a men's support group, AA meetings, and is looking for a new sponsor. Pt reports current sponsor has not been seen or heard from lately. The patient reports his family, friends, and girlfriend are supportive of his recovery. Pt reports  "he recently tried to play tennis with his SO, with mixed results.     T) Patient educated on Staying Motivated.  Patient has completed 104 hours of IOP, and 18 hours of Relapse Prevention.  Projected discharge date is 8/25/17. Current discharge plan is TBD.     Donald Welander, LADC        Psycho-Educational Curriculum  Date Attended  Psycho-Educational Curriculum  Date Attended    Acceptance   Shame/Guilt     1st Step   Anger/Rage     Affirmations   Mental Health     Automatic Negative Thoughts   Anxiety     Cross Addiction   Co-Occurring Disorders     Stages of Change   Kylah/Bipolar     Relapse   Trauma      Addictive Thoughts   Victim Identity     Coping Skills   Sober Structure     Relapse Prevention   Continuum of Care     Medical Aspects   Non-12 Step Support     Brain/Neurotransmitters   Priorities     Medication Compliance   Spirituality     TEE Alcohol/Drug Research   Weekend Planner     Physical Health   Educational Videos     Post Acute Withdrawal   1st Step     Pregnancy and Drug Use   2nd Step     Sexual Health   Assertive Communication     Short-Term/Long-Term Effects   My name is Celestine HILL    Relationships   Cross Addiction     Assertive Communication   God As We Understood Him     Boundaries   HBO Relapse     Codependence    HBO What Is Addiction     Defense Mechanisms    Medical Aspects 1     Family Roles   Medical Aspects 2     Goodbye Letter   National Geographic: Stress     Intimacy   PBS Depression Out of the Shadows     Needs/Dealbreakers in Relationships   The Anonymous People    Socialization Skills   [place]     Feelings   Ezekiel Mckeon \"Highjacked Brain\"    ABC Model of Emotion   Peter Gamez Humor in Tx    Grief and Loss   The Mindfulness Movie    Healthy vs. Unhealthy Feelings   Celestine HILL documentary     Meditation/Mindfulness       Overconfidence/Complacency       Resentments       Stress           "

## 2021-06-11 NOTE — PROGRESS NOTES
Weekly Progress Note  Santino Cutler  1990  [id number]      D) Pt attended 1 groups  this week with 0 absences. A) Staff facilitated groups and reviewed tx progress. Assessed for [facility]. R) No VAP needed at this time. Pt working on the following dimensions:    Dimension #1 - Withdrawal Potential - Risk 0. No concern.  Pt reports that his last date of use was 1/23/17. He reports a previous history of seizures, requiring medical attention, related to ETOH withdrawal. The patient reports ongoing abstinence since enrolling in programming.  Dimension #2 - Biomedical - Risk 0. No concern  Pt neither reports nor presents with biomedical conditions that would be a barrier to treatment.  Dimension #3 - Emotional/Behavioral/Cognitive - Risk 1. Mild concern.  Pt reports a diagnosis of Depression that he was previously managing with Lexapro. Pt reports symptoms of depression have improved with alcohol abstinence and he has discontinued off his psychotropic medication under the supervision of his primary care physician.  Dimension #4 - Treatment Acceptance/Resistance - Risk 0. No concern  Pt reports a strong desire to live a sober lifestyle and meet all treatment expectations. Pt does appear to be following all recommendations to build sober support.   Dimension #5 - Relapse Potential - Risk 2. Moderate concern  Pt remains vulnerable to relapse due to history of return to use and coping skills are just developing.   Dimension #6 - Recovery Environment - Risk 1. 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 The patient reports work is really going well now.  He reports that he regularly attends a men's support group, AA meetings, and works with a sponsor. The patient reports his family, friends, and girlfriend are supportive of his recovery.  The patient reports he recently sodded his parents yard while they were away and felt a good deal of pride  "and accomplishment. Pt reports this is something he would never do while using alcohol.     T) Patient educated on Keep it Simple.  Patient has completed 104 hours of IOP, and 14 hours of Relapse Prevention.  Projected discharge date is 8/25/17. Current discharge plan is TBD.     Donald Welander, LADC        Psycho-Educational Curriculum  Date Attended  Psycho-Educational Curriculum  Date Attended    Acceptance   Shame/Guilt     1st Step   Anger/Rage     Affirmations   Mental Health     Automatic Negative Thoughts   Anxiety     Cross Addiction   Co-Occurring Disorders     Stages of Change   Kylah/Bipolar     Relapse   Trauma      Addictive Thoughts   Victim Identity     Coping Skills   Sober Structure     Relapse Prevention   Continuum of Care     Medical Aspects   Non-12 Step Support     Brain/Neurotransmitters   Priorities     Medication Compliance   Spirituality     TEE Alcohol/Drug Research   Weekend Planner     Physical Health   Educational Videos     Post Acute Withdrawal   1st Step     Pregnancy and Drug Use   2nd Step     Sexual Health   Assertive Communication     Short-Term/Long-Term Effects   My name is Celestine Osman Addiction     Assertive Communication   God As We Understood Him     Boundaries   HBO Relapse     Codependence    HBO What Is Addiction     Defense Mechanisms    Medical Aspects 1     Family Roles   Medical Aspects 2     Goodbye Letter   [employer] Geographic: Stress     Intimacy   PBS Depression Out of the Shadows     Needs/Dealbreakers in Relationships   The Anonymous People    Socialization Skills   [place]     Feelings   Ezekiel Mckeon \"Highjacked Brain\"    ABC Model of Emotion   Peter Gamez Humor in Tx    Grief and Loss   The Mindfulness Movie    Healthy vs. Unhealthy Feelings   Celestine HILL documentary     Meditation/Mindfulness       Overconfidence/Complacency       Resentments       Stress           "

## 2021-06-11 NOTE — PROGRESS NOTES
Weekly Progress Note  Santino Cutler  1990  [id number]      D) Pt attended 1 groups  this week with 0 absences. A) Staff facilitated groups and reviewed tx progress. Assessed for [facility]. R) No VAP needed at this time. Pt working on the following dimensions:    Dimension #1 - Withdrawal Potential - Risk 0. No concern.  Pt reports that his last date of use was 1/23/17. He reports a previous history of seizures, requiring medical attention, related to ETOH withdrawal. The patient reports ongoing abstinence since enrolling in programming.  Dimension #2 - Biomedical - Risk 0. No concern  Pt neither reports nor presents with biomedical conditions that would be a barrier to treatment.  Dimension #3 - Emotional/Behavioral/Cognitive - Risk 1. Mild concern.  Pt reports a diagnosis of Depression that he was previously managing with Lexapro. Pt reports symptoms of depression have improved with alcohol abstinence and he has discontinued off his psychotropic medication under the supervision of his primary care physician.  Dimension #4 - Treatment Acceptance/Resistance - Risk 0. No concern  Pt reports a strong desire to live a sober lifestyle and meet all treatment expectations. Pt does appear to be following all recommendations to build sober support.   Dimension #5 - Relapse Potential - Risk 2. Moderate concern  Pt remains vulnerable to relapse due to history of return to use and coping skills are just developing. The patient reports he will be completing his fourth step with his sponsor this weekend and is anxious to begin his fifth step.  The patient identified numerous coping skills he utilized when surrounded with people consuming alcohol over the course of the weekend.  Dimension #6 - Recovery Environment - Risk 1. 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 The patient previously struggled with his work performance, but reports he is  successfully working independently and has exceeded his manager's expectations. He reports that he regularly attends a men's support group, AA meetings, and works with a sponsor. The patient has been placed on two year administrative probation as a result of his recent DWI conviction. The patient reports his family, friends, and girlfriend are supportive of his recovery.  The patient reports multiple sources of sober support, including his regular AA meetings at [facility].  The patient also reported a raise at his employer, which assists him in paying off his debt he acquired during his medical and hospital admissions.    T) Patient educated on Acceptance.  Patient has completed 104 hours of IOP, and 10 hours of Relapse Prevention.  Projected discharge date is 8/25/17. Current discharge plan is TBD.     KENJI May        Psycho-Educational Curriculum  Date Attended  Psycho-Educational Curriculum  Date Attended    Acceptance   Shame/Guilt     1st Step   Anger/Rage     Affirmations   Mental Health     Automatic Negative Thoughts   Anxiety     Cross Addiction   Co-Occurring Disorders     Stages of Change   Kylah/Bipolar     Relapse   Trauma      Addictive Thoughts   Victim Identity     Coping Skills   Sober Structure     Relapse Prevention   Continuum of Care     Medical Aspects   Non-12 Step Support     Brain/Neurotransmitters   Priorities     Medication Compliance   Spirituality     TEE Alcohol/Drug Research   Weekend Planner     Physical Health   Educational Videos     Post Acute Withdrawal   1st Step     Pregnancy and Drug Use   2nd Step     Sexual Health   Assertive Communication     Short-Term/Long-Term Effects   My name is Celestine Blanton   Cross Addiction     Assertive Communication   God As We Understood Him     Boundaries   HBO Relapse     Codependence    HBO What Is Addiction     Defense Mechanisms    Medical Aspects 1     Family Roles   Medical Aspects 2     Goodbye Letter    "National Geographic: Stress     Intimacy   PBS Depression Out of the Shadows     Needs/Dealbreakers in Relationships   The Anonymous People    Socialization Skills   [place]     Feelings   Ezekiel Mckeon \"Highjacked Brain\"    ABC Model of Emotion   Peter Gamez Humor in Tx    Grief and Loss   The Mindfulness Movie    Healthy vs. Unhealthy Feelings   Celestine HILL documentary     Meditation/Mindfulness       Overconfidence/Complacency       Resentments       Stress           "

## 2021-06-11 NOTE — PROGRESS NOTES
Weekly Progress Note  Santino Cutler  1990  [id number]      D) Pt attended 1 groups  this week with 0 absences. A) Staff facilitated groups and reviewed tx progress. Assessed for [facility]. R) No VAP needed at this time. Pt working on the following dimensions:    Dimension #1 - Withdrawal Potential - Risk 0. No concern.  Pt reports that his last date of use was 1/23/17. He reports a previous history of seizures, requiring medical attention, related to ETOH withdrawal. The patient reports ongoing abstinence since enrolling in programming.  Dimension #2 - Biomedical - Risk 0. No concern  Pt neither reports nor presents with biomedical conditions that would be a barrier to treatment.  Dimension #3 - Emotional/Behavioral/Cognitive - Risk 1. Mild concern.  Pt reports a diagnosis of Depression that he was previously managing with Lexapro. Pt reports symptoms of depression have improved with alcohol abstinence and he has discontinued off his psychotropic medication under the supervision of his primary care physician.  Dimension #4 - Treatment Acceptance/Resistance - Risk 0. No concern  Pt reports a strong desire to live a sober lifestyle and meet all treatment expectations. Pt does appear to be following all recommendations to build sober support.   Dimension #5 - Relapse Potential - Risk 2. Moderate concern  Pt remains vulnerable to relapse due to history of return to use and coping skills are just developing.   Dimension #6 - Recovery Environment - Risk 1. 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 The patient previously struggled with his work performance, but reports he is successfully working independently and has exceeded his manager's expectations. He reports that he regularly attends a men's support group, AA meetings, and works with a sponsor. The patient has been placed on two year administrative probation as a result of his recent  "DWI conviction. The patient reports his family, friends, and girlfriend are supportive of his recovery.     T) Patient educated on Sober Lifestyle.  Patient has completed 104 hours of IOP, and 8 hours of Relapse Prevention.  Projected discharge date is 8/25/17. Current discharge plan is TBD.     Donald Welander, LADC        Psycho-Educational Curriculum  Date Attended  Psycho-Educational Curriculum  Date Attended    Acceptance   Shame/Guilt     1st Step   Anger/Rage     Affirmations   Mental Health     Automatic Negative Thoughts   Anxiety     Cross Addiction   Co-Occurring Disorders     Stages of Change   Kylah/Bipolar     Relapse   Trauma      Addictive Thoughts   Victim Identity     Coping Skills   Sober Structure     Relapse Prevention   Continuum of Care     Medical Aspects   Non-12 Step Support     Brain/Neurotransmitters   Priorities     Medication Compliance   Spirituality     TEE Alcohol/Drug Research   Weekend Planner     Physical Health   Educational Videos     Post Acute Withdrawal   1st Step     Pregnancy and Drug Use   2nd Step     Sexual Health   Assertive Communication     Short-Term/Long-Term Effects   My name is Celestine HILL    Relationships   Cross Addiction     Assertive Communication   God As We Understood Him     Boundaries   HBO Relapse     Codependence    HBO What Is Addiction     Defense Mechanisms    Medical Aspects 1     Family Roles   Medical Aspects 2     Goodbye Letter   National Geographic: Stress     Intimacy   PBS Depression Out of the Shadows     Needs/Dealbreakers in Relationships   The Anonymous People    Socialization Skills   [place]     Feelings   Ezekiel Mckeon \"Highjacked Brain\"    ABC Model of Emotion   Peter Gamez Humor in Tx    Grief and Loss   The Mindfulness Movie    Healthy vs. Unhealthy Feelings   Celestine HILL documentary     Meditation/Mindfulness       Overconfidence/Complacency       Resentments       Stress           "

## 2021-06-11 NOTE — PROGRESS NOTES
Weekly Progress Note  Santino Cutler  1990  [id number]      D) Pt attended 1 groups  this week with 0 absences. A) Staff facilitated groups and reviewed tx progress. Assessed for [facility]. R) No VAP needed at this time. Pt working on the following dimensions:    Dimension #1 - Withdrawal Potential - Risk 0. No concern.  Pt reports that his last date of use was 1/23/17. He reports a previous history of seizures, requiring medical attention, related to ETOH withdrawal. The patient reports ongoing abstinence since enrolling in programming.  Dimension #2 - Biomedical - Risk 0. No concern  Pt neither reports nor presents with biomedical conditions that would be a barrier to treatment.  Dimension #3 - Emotional/Behavioral/Cognitive - Risk 1. Mild concern.  Pt reports a diagnosis of Depression that he was previously managing with Lexapro. Pt reports symptoms of depression have improved with alcohol abstinence and he has discontinued off his psychotropic medication under the supervision of his primary care physician.  Dimension #4 - Treatment Acceptance/Resistance - Risk 0. No concern  Pt reports a strong desire to live a sober lifestyle and meet all treatment expectations. Pt does appear to be following all recommendations to build sober support.   Dimension #5 - Relapse Potential - Risk 2. Moderate concern  Pt remains vulnerable to relapse due to history of return to use and coping skills are just developing. The patient reports he was unable to complete his fifth step with his sponsor due to a schedule conflict, but he plans to do so soon.   Dimension #6 - Recovery Environment - Risk 1. 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 The patient reports work is really going well now.  He reports that he regularly attends a men's support group, AA meetings, and works with a sponsor. The patient has been placed on two year administrative  "probation as a result of his recent DWI conviction. The patient reports his family, friends, and girlfriend are supportive of his recovery.  The patient reports multiple sources of sober support, including his regular AA meetings at [facility].      T) Patient educated on Progress in Recovery.  Patient has completed 104 hours of IOP, and 12 hours of Relapse Prevention.  Projected discharge date is 8/25/17. Current discharge plan is TBD.     Donald Welander, LADC        Psycho-Educational Curriculum  Date Attended  Psycho-Educational Curriculum  Date Attended    Acceptance   Shame/Guilt     1st Step   Anger/Rage     Affirmations   Mental Health     Automatic Negative Thoughts   Anxiety     Cross Addiction   Co-Occurring Disorders     Stages of Change   Kylah/Bipolar     Relapse   Trauma      Addictive Thoughts   Victim Identity     Coping Skills   Sober Structure     Relapse Prevention   Continuum of Care     Medical Aspects   Non-12 Step Support     Brain/Neurotransmitters   Priorities     Medication Compliance   Spirituality     TEE Alcohol/Drug Research   Weekend Planner     Physical Health   Educational Videos     Post Acute Withdrawal   1st Step     Pregnancy and Drug Use   2nd Step     Sexual Health   Assertive Communication     Short-Term/Long-Term Effects   My name is Celestine Blanton   Cross Addiction     Assertive Communication   God As We Understood Him     Boundaries   HBO Relapse     Codependence    HBO What Is Addiction     Defense Mechanisms    Medical Aspects 1     Family Roles   Medical Aspects 2     Goodbye Letter   National Geographic: Stress     Intimacy   PBS Depression Out of the Shadows     Needs/Dealbreakers in Relationships   The Anonymous People    Socialization Skills   [place]     Feelings   Ezekiel Mckeon \"Highjacked Brain\"    ABC Model of Emotion   Peter Gamez Humor in Tx    Grief and Loss   The Mindfulness Movie    Healthy vs. Unhealthy Feelings   Celestine HILL documentary   "   Meditation/Mindfulness       Overconfidence/Complacency       Resentments       Stress

## 2021-06-11 NOTE — PROGRESS NOTES
Weekly Progress Note  Santino Cutler  1990  [id number]      D) Pt attended 1 groups  this week with 0 absences. A) Staff facilitated groups and reviewed tx progress. Assessed for [facility]. R) No VAP needed at this time. Pt working on the following dimensions:    Dimension #1 - Withdrawal Potential - Risk 0. No concern.  Pt reports that his last date of use was 1/23/17. He reports a previous history of seizures, requiring medical attention, related to ETOH withdrawal. The patient reports ongoing abstinence since enrolling in programming.  Dimension #2 - Biomedical - Risk 0. No concern  Pt neither reports nor presents with biomedical conditions that would be a barrier to treatment.  Dimension #3 - Emotional/Behavioral/Cognitive - Risk 1. Mild concern.  Pt reports a diagnosis of Depression that he was previously managing with Lexapro. Pt reports symptoms of depression have improved with alcohol abstinence and he has discontinued off his psychotropic medication under the supervision of his primary care physician. Pt reports some grumpiness due to working long hours.   Dimension #4 - Treatment Acceptance/Resistance - Risk 0. No concern  Pt reports a strong desire to live a sober lifestyle and meet all treatment expectations. Pt does appear to be following all recommendations to build sober support.   Dimension #5 - Relapse Potential - Risk 2. Moderate concern  Pt remains vulnerable to relapse due to history of return to use and coping skills are just developing. Pt reported an urge to drink while working long hours during the holiday week, but was able to cope without use.   Dimension #6 - Recovery Environment - Risk 1. 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 The patient reports work is really going well now.  He reports that he regularly attends a men's support group, AA meetings, and works with a sponsor. The patient reports his  "family, friends, and girlfriend are supportive of his recovery. Pt reports he recently tried to play tennis with his SO, with mixed results.     T) Patient educated on Medical Aspects.  Patient has completed 104 hours of IOP, and 16 hours of Relapse Prevention.  Projected discharge date is 8/25/17. Current discharge plan is TBD.     Donald Welander, LADC        Psycho-Educational Curriculum  Date Attended  Psycho-Educational Curriculum  Date Attended    Acceptance   Shame/Guilt     1st Step   Anger/Rage     Affirmations   Mental Health     Automatic Negative Thoughts   Anxiety     Cross Addiction   Co-Occurring Disorders     Stages of Change   Kylah/Bipolar     Relapse   Trauma      Addictive Thoughts   Victim Identity     Coping Skills   Sober Structure     Relapse Prevention   Continuum of Care     Medical Aspects   Non-12 Step Support     Brain/Neurotransmitters   Priorities     Medication Compliance   Spirituality     TEE Alcohol/Drug Research   Weekend Planner     Physical Health   Educational Videos     Post Acute Withdrawal   1st Step     Pregnancy and Drug Use   2nd Step     Sexual Health   Assertive Communication     Short-Term/Long-Term Effects   My name is Celestine HILL    Relationships   Cross Addiction     Assertive Communication   God As We Understood Him     Boundaries   HBO Relapse     Codependence    HBO What Is Addiction     Defense Mechanisms    Medical Aspects 1     Family Roles   Medical Aspects 2     Goodbye Letter   [employer] Geographic: Stress     Intimacy   PBS Depression Out of the Shadows     Needs/Dealbreakers in Relationships   The Anonymous People    Socialization Skills   [place]     Feelings   Ezekiel Mckeon \"Highjacked Brain\"    ABC Model of Emotion   Peter Gamez Humor in Tx    Grief and Loss   The Mindfulness Movie    Healthy vs. Unhealthy Feelings   Celestine HILL documentary     Meditation/Mindfulness       Overconfidence/Complacency       Resentments       Stress           "

## 2021-06-12 NOTE — PROGRESS NOTES
[facility] CHEMICAL DEPENDENCY   DISCHARGE SUMMARY       NAME:  Santino Cutler   Physician:  No Primary Care Provider   MRN:  [id number] :  Donald Welander, [facility]   #:  [id number] Funding Source:  [employer]   Admit Date: 17 Discharge Date: 2017     :  1990 Hours Completed: 104 hours IOP, 24 hours Relapse Prevention aftercare   Initial Diagnosis:  Severe Alcohol Use Disorder, F10.20   Final Diagnosis:  Severe Alcohol Use Disorder, F10.20, in early remission   Discharge Address:    [address] [place] [address]          Discharge Type:  With Staff Approval (WSA)    Reasons for and circumstances of service termination:   Santino has successfully completed both IOP and the Relapse Prevention aftercare program. Pt met all treatment attendance, engagement, and abstinence expectations and is discharged on this date, 2017, WSA.        Dimension/Course of Treatment/Individualized Care:   1. Withdrawal Potential - Risk level - 0. No concern.  Pt reports that his last date of use was 17. No concerns.      2. Biomedical Conditions and Complications - Risk level - 0. No concern  Pt neither reports nor presents with biomedical conditions that would be a barrier to treatment.     3. Emotional/Behavioral/Cognitive Conditions and Complications - Risk level -  1. Mild concern.  Pt reports a diagnosis of Depression that he was previously managing with Lexapro. Pt reports symptoms of depression have improved with alcohol abstinence and he has discontinued off his psychotropic medication under the supervision of his primary care physician.     4. Treatment Acceptance/Resistance - Risk Level -0. No concern  Pt reports a strong desire to live a sober lifestyle and met all treatment expectations. Pt does appear to be following all recommendations to build sober support.      5. Relapse/Continued Use/Continued Problem Potential - Risk 1, mild concern  Pt remains somewhat vulnerable  to relapse due to history of return to use but coping skills are developing. Pt appears to be content with his sober living at this time.      6. Recovery Environment - Risk level -1. 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 The patient reports work is really going well now.  He reports that he regularly attends a men's support group, AA meetings, and is looking for a new sponsor. Pt reports current sponsor has not been seen or heard from lately. The patient reports his family, friends, and girlfriend are supportive of his recovery. Pt reports he has been helping his father with yard work and has been going to dinner with his SO.      Services Provided: Intake, assessment, treatment planning, education, group discussion, film, lectures, 1x1 therapy, and recommendations at discharge.     Strengths: Intelligent, hard working, good sense of humor, supportive of peers and acknowledges the need for abstinence.   Needs: Remain clean and sober and adhere to probationary obligations.        Program Involvement: Good  Attendance: Excellent  Ability to relate in group/   Other program activities: Good  Assignment Completion: Fair  Overall Behavior: Good  Reported Family/Significant   Other Involvement: Good    Prognosis: Good      Recommendations       Remain clean and sober and adhere to probationary obligations.     Mental Health Referral  None at discharge      Physical Health Referral:  Personal Physician          Counselor Name and Title:  Donald Welander, LADC    Date:  8/14/2017  Time:  4:06 PM
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
## 2021-06-12 NOTE — PROGRESS NOTES
Weekly Progress Note  Santino Cutler  1990  [id number]      D) Pt attended 1 groups  this week with 0 absences. A) Staff facilitated groups and reviewed tx progress. Assessed for [facility]. R) No VAP needed at this time. Pt working on the following dimensions:    Dimension #1 - Withdrawal Potential - Risk 0. No concern.  Pt reports that his last date of use was 1/23/17. No concerns.   Dimension #2 - Biomedical - Risk 0. No concern  Pt neither reports nor presents with biomedical conditions that would be a barrier to treatment.  Dimension #3 - Emotional/Behavioral/Cognitive - Risk 1. Mild concern.  Pt reports a diagnosis of Depression that he was previously managing with Lexapro. Pt reports symptoms of depression have improved with alcohol abstinence and he has discontinued off his psychotropic medication under the supervision of his primary care physician. Pt reports a quiet week with few stressors.    Dimension #4 - Treatment Acceptance/Resistance - Risk 0. No concern  Pt reports a strong desire to live a sober lifestyle and meet all treatment expectations. Pt does appear to be following all recommendations to build sober support.   Dimension #5 - Relapse Potential - Risk 1, mild concern  Pt remains somewhat vulnerable to relapse due to history of return to use but coping skills are developing. Pt appears to be content with his sober living at this time.   Dimension #6 - Recovery Environment - Risk 1. 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 The patient reports work is really going well now.  He reports that he regularly attends a men's support group, AA meetings, and is looking for a new sponsor. Pt reports current sponsor has not been seen or heard from lately. The patient reports his family, friends, and girlfriend are supportive of his recovery. Pt reports he has been helping his father with yard work and has been going to  "dinner with his SO.     T) Patient educated on Medical Aspects of Addiction.  Patient has completed 104 hours of IOP, and 22 hours of Relapse Prevention.  Projected discharge date is 8/11/17. Current discharge plan is TBD.     Donald Welander, LADC        Psycho-Educational Curriculum  Date Attended  Psycho-Educational Curriculum  Date Attended    Acceptance   Shame/Guilt     1st Step   Anger/Rage     Affirmations   Mental Health     Automatic Negative Thoughts   Anxiety     Cross Addiction   Co-Occurring Disorders     Stages of Change   Kylah/Bipolar     Relapse   Trauma      Addictive Thoughts   Victim Identity     Coping Skills   Sober Structure     Relapse Prevention   Continuum of Care     Medical Aspects   Non-12 Step Support     Brain/Neurotransmitters   Priorities     Medication Compliance   Spirituality     TEE Alcohol/Drug Research   Weekend Planner     Physical Health   Educational Videos     Post Acute Withdrawal   1st Step     Pregnancy and Drug Use   2nd Step     Sexual Health   Assertive Communication     Short-Term/Long-Term Effects   My name is Celestine HILL    Relationships   Cross Addiction     Assertive Communication   God As We Understood Him     Boundaries   HBO Relapse     Codependence    HBO What Is Addiction     Defense Mechanisms    Medical Aspects 1     Family Roles   Medical Aspects 2     Goodbye Letter   National Geographic: Stress     Intimacy   PBS Depression Out of the Shadows     Needs/Dealbreakers in Relationships   The Anonymous People    Socialization Skills   [place]     Feelings   Ezekiel Mckeon \"Highjacked Brain\"    ABC Model of Emotion   Peter Gamez Humor in Tx    Grief and Loss   The Mindfulness Movie    Healthy vs. Unhealthy Feelings   Celestine HILL documentary     Meditation/Mindfulness       Overconfidence/Complacency       Resentments       Stress           "

## 2021-06-15 PROBLEM — K57.32 DIVERTICULITIS LARGE INTESTINE: Status: ACTIVE | Noted: 2017-03-09

## 2021-07-03 NOTE — ADDENDUM NOTE
Addendum Note by Harleen Whitehead MD at 2/16/2017 10:40 AM     Author: Harleen Whitehead MD Service: -- Author Type: Physician    Filed: 2/16/2017 10:40 AM Encounter Date: 2/7/2017 Status: Signed    : Harleen Whitehead MD (Physician)    Addended by: HARLEEN WHITEHEAD on: 2/16/2017 10:40 AM        Modules accepted: Orders
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
## 2022-01-17 ENCOUNTER — LAB REQUISITION (OUTPATIENT)
Dept: LAB | Facility: CLINIC | Age: 32
End: 2022-01-17

## 2022-01-17 LAB — HBV SURFACE AB SERPL IA-ACNC: 328.31 M[IU]/ML

## 2022-01-17 PROCEDURE — 86706 HEP B SURFACE ANTIBODY: CPT | Performed by: INTERNAL MEDICINE

## 2022-01-17 PROCEDURE — 86481 TB AG RESPONSE T-CELL SUSP: CPT | Performed by: INTERNAL MEDICINE

## 2022-01-19 LAB
GAMMA INTERFERON BACKGROUND BLD IA-ACNC: 0.04 IU/ML
M TB IFN-G BLD-IMP: NEGATIVE
M TB IFN-G CD4+ BCKGRND COR BLD-ACNC: 9.96 IU/ML
MITOGEN IGNF BCKGRD COR BLD-ACNC: 0 IU/ML
MITOGEN IGNF BCKGRD COR BLD-ACNC: 0.01 IU/ML
QUANTIFERON MITOGEN: 10 IU/ML
QUANTIFERON NIL TUBE: 0.04 IU/ML
QUANTIFERON TB1 TUBE: 0.04 IU/ML
QUANTIFERON TB2 TUBE: 0.05

## 2023-04-09 ENCOUNTER — APPOINTMENT (OUTPATIENT)
Dept: GENERAL RADIOLOGY | Facility: CLINIC | Age: 33
End: 2023-04-09
Attending: STUDENT IN AN ORGANIZED HEALTH CARE EDUCATION/TRAINING PROGRAM
Payer: COMMERCIAL

## 2023-04-09 ENCOUNTER — HOSPITAL ENCOUNTER (EMERGENCY)
Facility: CLINIC | Age: 33
Discharge: HOME | End: 2023-04-09
Attending: STUDENT IN AN ORGANIZED HEALTH CARE EDUCATION/TRAINING PROGRAM | Admitting: STUDENT IN AN ORGANIZED HEALTH CARE EDUCATION/TRAINING PROGRAM
Payer: COMMERCIAL

## 2023-04-09 VITALS
HEIGHT: 72 IN | OXYGEN SATURATION: 98 % | HEART RATE: 109 BPM | SYSTOLIC BLOOD PRESSURE: 143 MMHG | DIASTOLIC BLOOD PRESSURE: 94 MMHG | RESPIRATION RATE: 16 BRPM | TEMPERATURE: 98.7 F | WEIGHT: 175.49 LBS | BODY MASS INDEX: 23.77 KG/M2

## 2023-04-09 DIAGNOSIS — R07.89 CHEST TIGHTNESS: ICD-10-CM

## 2023-04-09 DIAGNOSIS — J06.9 VIRAL URI: ICD-10-CM

## 2023-04-09 DIAGNOSIS — E86.0 DEHYDRATION: ICD-10-CM

## 2023-04-09 LAB
ALBUMIN SERPL BCG-MCNC: 4.7 G/DL (ref 3.5–5.2)
ALP SERPL-CCNC: 76 U/L (ref 40–129)
ALT SERPL W P-5'-P-CCNC: 19 U/L (ref 10–50)
ANION GAP SERPL CALCULATED.3IONS-SCNC: 14 MMOL/L (ref 7–15)
AST SERPL W P-5'-P-CCNC: 19 U/L (ref 10–50)
BASOPHILS # BLD AUTO: 0 10E3/UL (ref 0–0.2)
BASOPHILS NFR BLD AUTO: 1 %
BILIRUB SERPL-MCNC: 1.1 MG/DL
BUN SERPL-MCNC: 13 MG/DL (ref 6–20)
CALCIUM SERPL-MCNC: 9.3 MG/DL (ref 8.6–10)
CHLORIDE SERPL-SCNC: 100 MMOL/L (ref 98–107)
CREAT SERPL-MCNC: 1.06 MG/DL (ref 0.67–1.17)
D DIMER PPP FEU-MCNC: <0.27 UG/ML FEU (ref 0–0.5)
DEPRECATED HCO3 PLAS-SCNC: 26 MMOL/L (ref 22–29)
EOSINOPHIL # BLD AUTO: 0.1 10E3/UL (ref 0–0.7)
EOSINOPHIL NFR BLD AUTO: 1 %
ERYTHROCYTE [DISTWIDTH] IN BLOOD BY AUTOMATED COUNT: 12.1 % (ref 10–15)
FLUAV RNA SPEC QL NAA+PROBE: NEGATIVE
FLUBV RNA RESP QL NAA+PROBE: NEGATIVE
GFR SERPL CREATININE-BSD FRML MDRD: >90 ML/MIN/1.73M2
GLUCOSE SERPL-MCNC: 165 MG/DL (ref 70–99)
HCT VFR BLD AUTO: 41.9 % (ref 40–53)
HGB BLD-MCNC: 13.7 G/DL (ref 13.3–17.7)
HOLD SPECIMEN: NORMAL
IMM GRANULOCYTES # BLD: 0.1 10E3/UL
IMM GRANULOCYTES NFR BLD: 1 %
INR PPP: 1.01 (ref 0.85–1.15)
LYMPHOCYTES # BLD AUTO: 2.1 10E3/UL (ref 0.8–5.3)
LYMPHOCYTES NFR BLD AUTO: 25 %
MAGNESIUM SERPL-MCNC: 1.8 MG/DL (ref 1.7–2.3)
MCH RBC QN AUTO: 27.3 PG (ref 26.5–33)
MCHC RBC AUTO-ENTMCNC: 32.7 G/DL (ref 31.5–36.5)
MCV RBC AUTO: 84 FL (ref 78–100)
MONOCYTES # BLD AUTO: 0.6 10E3/UL (ref 0–1.3)
MONOCYTES NFR BLD AUTO: 7 %
NEUTROPHILS # BLD AUTO: 5.6 10E3/UL (ref 1.6–8.3)
NEUTROPHILS NFR BLD AUTO: 65 %
NRBC # BLD AUTO: 0 10E3/UL
NRBC BLD AUTO-RTO: 0 /100
NT-PROBNP SERPL-MCNC: <5 PG/ML (ref 0–450)
PLATELET # BLD AUTO: 250 10E3/UL (ref 150–450)
POTASSIUM SERPL-SCNC: 3.7 MMOL/L (ref 3.4–5.3)
PROT SERPL-MCNC: 7.3 G/DL (ref 6.4–8.3)
RBC # BLD AUTO: 5.01 10E6/UL (ref 4.4–5.9)
RSV RNA SPEC NAA+PROBE: NEGATIVE
SARS-COV-2 RNA RESP QL NAA+PROBE: NEGATIVE
SODIUM SERPL-SCNC: 140 MMOL/L (ref 136–145)
TROPONIN T SERPL HS-MCNC: <6 NG/L
WBC # BLD AUTO: 8.4 10E3/UL (ref 4–11)

## 2023-04-09 PROCEDURE — 36415 COLL VENOUS BLD VENIPUNCTURE: CPT | Performed by: STUDENT IN AN ORGANIZED HEALTH CARE EDUCATION/TRAINING PROGRAM

## 2023-04-09 PROCEDURE — 93005 ELECTROCARDIOGRAM TRACING: CPT | Performed by: STUDENT IN AN ORGANIZED HEALTH CARE EDUCATION/TRAINING PROGRAM

## 2023-04-09 PROCEDURE — 85610 PROTHROMBIN TIME: CPT | Performed by: STUDENT IN AN ORGANIZED HEALTH CARE EDUCATION/TRAINING PROGRAM

## 2023-04-09 PROCEDURE — 71046 X-RAY EXAM CHEST 2 VIEWS: CPT

## 2023-04-09 PROCEDURE — 85025 COMPLETE CBC W/AUTO DIFF WBC: CPT | Performed by: STUDENT IN AN ORGANIZED HEALTH CARE EDUCATION/TRAINING PROGRAM

## 2023-04-09 PROCEDURE — 71046 X-RAY EXAM CHEST 2 VIEWS: CPT | Mod: 26 | Performed by: RADIOLOGY

## 2023-04-09 PROCEDURE — 93010 ELECTROCARDIOGRAM REPORT: CPT | Performed by: STUDENT IN AN ORGANIZED HEALTH CARE EDUCATION/TRAINING PROGRAM

## 2023-04-09 PROCEDURE — 96361 HYDRATE IV INFUSION ADD-ON: CPT | Performed by: STUDENT IN AN ORGANIZED HEALTH CARE EDUCATION/TRAINING PROGRAM

## 2023-04-09 PROCEDURE — 84484 ASSAY OF TROPONIN QUANT: CPT | Performed by: STUDENT IN AN ORGANIZED HEALTH CARE EDUCATION/TRAINING PROGRAM

## 2023-04-09 PROCEDURE — 87637 SARSCOV2&INF A&B&RSV AMP PRB: CPT | Performed by: STUDENT IN AN ORGANIZED HEALTH CARE EDUCATION/TRAINING PROGRAM

## 2023-04-09 PROCEDURE — 96360 HYDRATION IV INFUSION INIT: CPT | Performed by: STUDENT IN AN ORGANIZED HEALTH CARE EDUCATION/TRAINING PROGRAM

## 2023-04-09 PROCEDURE — 99285 EMERGENCY DEPT VISIT HI MDM: CPT | Mod: 25,CS | Performed by: STUDENT IN AN ORGANIZED HEALTH CARE EDUCATION/TRAINING PROGRAM

## 2023-04-09 PROCEDURE — 83735 ASSAY OF MAGNESIUM: CPT | Performed by: STUDENT IN AN ORGANIZED HEALTH CARE EDUCATION/TRAINING PROGRAM

## 2023-04-09 PROCEDURE — 99284 EMERGENCY DEPT VISIT MOD MDM: CPT | Mod: 25 | Performed by: STUDENT IN AN ORGANIZED HEALTH CARE EDUCATION/TRAINING PROGRAM

## 2023-04-09 PROCEDURE — 85379 FIBRIN DEGRADATION QUANT: CPT | Performed by: STUDENT IN AN ORGANIZED HEALTH CARE EDUCATION/TRAINING PROGRAM

## 2023-04-09 PROCEDURE — 258N000003 HC RX IP 258 OP 636: Performed by: STUDENT IN AN ORGANIZED HEALTH CARE EDUCATION/TRAINING PROGRAM

## 2023-04-09 PROCEDURE — 83880 ASSAY OF NATRIURETIC PEPTIDE: CPT | Performed by: STUDENT IN AN ORGANIZED HEALTH CARE EDUCATION/TRAINING PROGRAM

## 2023-04-09 PROCEDURE — 80053 COMPREHEN METABOLIC PANEL: CPT | Performed by: STUDENT IN AN ORGANIZED HEALTH CARE EDUCATION/TRAINING PROGRAM

## 2023-04-09 PROCEDURE — C9803 HOPD COVID-19 SPEC COLLECT: HCPCS | Performed by: STUDENT IN AN ORGANIZED HEALTH CARE EDUCATION/TRAINING PROGRAM

## 2023-04-09 RX ORDER — ALBUTEROL SULFATE 90 UG/1
2 AEROSOL, METERED RESPIRATORY (INHALATION) EVERY 6 HOURS PRN
Qty: 18 G | Refills: 0 | Status: SHIPPED | OUTPATIENT
Start: 2023-04-09 | End: 2023-04-09

## 2023-04-09 RX ORDER — SODIUM CHLORIDE, SODIUM LACTATE, POTASSIUM CHLORIDE, CALCIUM CHLORIDE 600; 310; 30; 20 MG/100ML; MG/100ML; MG/100ML; MG/100ML
125 INJECTION, SOLUTION INTRAVENOUS CONTINUOUS
Status: DISCONTINUED | OUTPATIENT
Start: 2023-04-09 | End: 2023-04-09 | Stop reason: HOSPADM

## 2023-04-09 RX ORDER — ALBUTEROL SULFATE 90 UG/1
2 AEROSOL, METERED RESPIRATORY (INHALATION) EVERY 6 HOURS PRN
Qty: 18 G | Refills: 0 | Status: SHIPPED | OUTPATIENT
Start: 2023-04-09

## 2023-04-09 RX ORDER — ACETAMINOPHEN 500 MG
1000 TABLET ORAL ONCE
Status: COMPLETED | OUTPATIENT
Start: 2023-04-09 | End: 2023-04-09

## 2023-04-09 RX ADMIN — SODIUM CHLORIDE, POTASSIUM CHLORIDE, SODIUM LACTATE AND CALCIUM CHLORIDE 1000 ML: 600; 310; 30; 20 INJECTION, SOLUTION INTRAVENOUS at 17:30

## 2023-04-09 ASSESSMENT — ACTIVITIES OF DAILY LIVING (ADL): ADLS_ACUITY_SCORE: 35

## 2023-04-09 NOTE — DISCHARGE INSTRUCTIONS
Please make an appointment to follow up with [facility] (phone: [phone]) in 7-14 days unless symptoms completely resolve and/or to establish primary care.

## 2023-04-09 NOTE — ED TRIAGE NOTES
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 At 9AM pt had sudden feeling of light headed and dizziness. Pt also had tight chest pain and has had lingering dull pain since. Recent flu like illness.      Triage Assessment     Row Name 04/09/23 7051       Respiratory WDL    Respiratory WDL WDL       Skin Circulation/Temperature WDL    Skin Circulation/Temperature WDL WDL       Cardiac WDL    Cardiac WDL X;rhythm    Cardiac Rhythm ST       Chest Pain Assessment    Chest Pain Location --  \"warm feeling.\"    Character other (see comments)  \"warm feeling.\"    Associated Signs/Symptoms dizziness;other (see comments)  light headedness    Chest Pain Intervention cardiac monitor placed;12-lead ECG obtained       Peripheral/Neurovascular WDL    Peripheral Neurovascular WDL WDL       Cognitive/Neuro/Behavioral WDL    Cognitive/Neuro/Behavioral WDL WDL              "

## 2023-04-09 NOTE — ED NOTES
Bed: ED26  Expected date:   Expected time:   Means of arrival:   Comments:  Triage 7   Goal Outcome Evaluation:   VSS and afebrile throughout shift. Pt alert and oriented x 4. Pt remains on room air. Pt removed monitor and left the floor at the beginning of shift. Pt has been educated. Pt complained of pain in the left foot after returning to floor. Pain meds given per order. Safety maintained. Will continue to monitor.

## 2023-04-09 NOTE — ED PROVIDER NOTES
.    [place] EMERGENCY DEPARTMENT ([facility])    4/09/23       ED PROVIDER NOTE        History     Chief Complaint   Patient presents with     Chest Pain     The history is provided by the patient and medical records.     Santino Cutler is a 33 year old male with a past medical history of severe alcohol use disorder s/p IOP and relapse prevention aftercare program and past hypertension is brought to the ED from from the floor for evaluation of light headedness, chest pain and tightness. Patient is a ICU nurse at [employer], and reports feeling light headed last night before going to bed, at which time he took benadryl and went to sleep. Patient felt better upon waking up and subsequently came to work. At 9AM today, patient reports feeling severe light headedness, dizziness, chest pain and tightness. Patient reports breathing faster than normal. Denies sob on exertion. Patient reports consuming less caffeine in the past week than he normally would. He says his chest pain has improved since morning. Reports taking dayquil. Patient reports his kids are positive for influenza.       Past Medical History  Past Medical History:   Diagnosis Date     Substance abuse (H)     alcohol abuse     History reviewed. No pertinent surgical history.  albuterol (PROAIR HFA/PROVENTIL HFA/VENTOLIN HFA) 108 (90 Base) MCG/ACT inhaler  escitalopram (LEXAPRO) 10 MG tablet  traZODone (DESYREL) 50 MG tablet      No Known Allergies  Family History  History reviewed. No pertinent family history.  Social History   Social History     Tobacco Use     Smoking status: Unknown   Substance Use Topics     Alcohol use: No     Comment: Alcoholic Drinks/day: Recovering alcoholic (45 days sober)      Past medical history, past surgical history, medications, allergies, family history, and social history were reviewed with the patient. No additional pertinent items.      A complete review of systems was performed with pertinent positives and negatives  noted in the HPI, and all other systems negative.    Physical Exam   BP: (!) 143/94  Pulse: 109  Temp: 98.7  F (37.1  C)  Resp: 16  Height: 182.9 cm (6')  Weight: 79.6 kg (175 lb 7.8 oz)  SpO2: 98 %  Physical Exam  Vitals and nursing note reviewed.     Vital Signs Reviewed  Gen: Well nourished, well developed, resting comfortably, no acute distress  HEENT: NC/AT, PERRL, EOMI, MMM  Neck: Supple, FROM  CV: Regular tachycardia, 2+ equal bilateral radial pulses, brisk capillary refill  Lungs/Chest: Normal Effort, CTAB  Abd: Non-distended, non-tender  MSK/Back: FROM, no visible deformity  Neuro: A&Ox3, GCS 15, CN II-XII unremarkable moving all extremities with good tone.  Skin: Warm, Dry, Intact, no visible lesions    ED Course, Procedures, & Data   5:15 PM  The patient was seen and examined by Ezekiel Barrett MD in Room ED26.      ED Course as of 04/09/23 1916   Sun Apr 09, 2023   1724 Patient seen and assessed. EKG with sinus tachycardia, 108 bpm, no acute ischemic changes. No electrical RHS.   1841 N-Terminal Pro BNP Inpatient: <5   1841 Troponin T, High Sensitivity: <6   1841 D-Dimer Quantitative: <0.27   1842 XR Chest 2 Views   1842 I have independently interpreted the chest x-ray. No focal consolidations to suggest PNA, no PTX. Final read pending - prelim with peribronchial cuffing.   1906 XR Chest 2 Views  No acute disease.   1907 Patient reassessed. Feeling comfortable, HR down to 80s after fluids. Moving good air. Likely viral URI/viral syndrome and dehydration, stable to discharge to outpatient follow-up.     Procedures       ED Course Selections:        EKG Interpretation:      Interpreted by Ezekiel Barrett MD  Time reviewed: 1715  Symptoms at time of EKG: Chest Tightness   Rhythm: sinus tachycardia  Rate: 100-110  Axis: Normal  Ectopy: none  Conduction: right bundle branch block (incomplete)  ST Segments/ T Waves: No acute ischemic changes  Q Waves: none  Comparison to prior: No old EKG  available    Clinical Impression: Sinus tachycardia, no acute ischemic changes              Results for orders placed or performed during the hospital encounter of 04/09/23   XR Chest 2 Views     Status: None    Narrative    Exam: XR CHEST 2 VIEWS, 4/9/2023 6:06 PM    Comparison: None    History: Chest pain, palpitations, lightheadedness, recent viral  illness    Findings:  PA and lateral views of the chest. Cardiac silhouette is within normal  limits. No pneumothorax or pleural effusion. No acute airspace  opacity. No acute osseous abnormality.      Impression    Impression: No acute airspace disease.     I have personally reviewed the examination and initial interpretation  and I agree with the findings.    TORIE RUSSELL,          SYSTEM ID:  [id number]   Comprehensive metabolic panel     Status: Abnormal   Result Value Ref Range    Sodium 140 136 - 145 mmol/L    Potassium 3.7 3.4 - 5.3 mmol/L    Chloride 100 98 - 107 mmol/L    Carbon Dioxide (CO2) 26 22 - 29 mmol/L    Anion Gap 14 7 - 15 mmol/L    Urea Nitrogen 13.0 6.0 - 20.0 mg/dL    Creatinine 1.06 0.67 - 1.17 mg/dL    Calcium 9.3 8.6 - 10.0 mg/dL    Glucose 165 (H) 70 - 99 mg/dL    Alkaline Phosphatase 76 40 - 129 U/L    AST 19 10 - 50 U/L    ALT 19 10 - 50 U/L    Protein Total 7.3 6.4 - 8.3 g/dL    Albumin 4.7 3.5 - 5.2 g/dL    Bilirubin Total 1.1 <=1.2 mg/dL    GFR Estimate >90 >60 mL/min/1.73m2   Troponin T, High Sensitivity     Status: Normal   Result Value Ref Range    Troponin T, High Sensitivity <6 <=22 ng/L   Magnesium     Status: Normal   Result Value Ref Range    Magnesium 1.8 1.7 - 2.3 mg/dL   D dimer quantitative     Status: Normal   Result Value Ref Range    D-Dimer Quantitative <0.27 0.00 - 0.50 ug/mL FEU    Narrative    This D-dimer assay is intended for use in conjunction with a clinical pretest probability assessment model to exclude pulmonary embolism (PE) and deep venous thrombosis (DVT) in outpatients suspected of PE or DVT. The cut-off  value is 0.50 ug/mL FEU.   INR     Status: Normal   Result Value Ref Range    INR 1.01 0.85 - 1.15   Nt probnp inpatient (BNP)     Status: Normal   Result Value Ref Range    N terminal Pro BNP Inpatient <5 0 - 450 pg/mL   Symptomatic Influenza A/B, RSV, & SARS-CoV2 PCR (COVID-19) Nasopharyngeal     Status: Normal    Specimen: Nasopharyngeal; Swab   Result Value Ref Range    Influenza A PCR Negative Negative    Influenza B PCR Negative Negative    RSV PCR Negative Negative    SARS CoV2 PCR Negative Negative    Narrative    Testing was performed using the Xpert Xpress CoV2/Flu/RSV Assay on the [employer] compert Instrument. This test should be ordered for the detection of SARS-CoV-2, influenza, and RSV viruses in individuals who meet clinical and/or epidemiological criteria. Test performance is unknown in asymptomatic patients. This test is for in vitro diagnostic use under the FDA EUA for laboratories certified under CLIA to perform high or moderate complexity testing. This test has not been FDA cleared or approved. A negative result does not rule out the presence of PCR inhibitors in the specimen or target RNA in concentration below the limit of detection for the assay. If only one viral target is positive but coinfection with multiple targets is suspected, the sample should be re-tested with another FDA cleared, approved, or authorized test, if coinfection would change clinical management. This test was validated by the [facility]. These laboratories are certified under the Clinical Laboratory Improvement Amendments of 1988 (CLIA-88) as qualified to perform high complexity laboratory testing.   CBC with platelets and differential     Status: None   Result Value Ref Range    WBC Count 8.4 4.0 - 11.0 10e3/uL    RBC Count 5.01 4.40 - 5.90 10e6/uL    Hemoglobin 13.7 13.3 - 17.7 g/dL    Hematocrit 41.9 40.0 - 53.0 %    MCV 84 78 - 100 fL    MCH 27.3 26.5 - 33.0 pg    MCHC 32.7 31.5 - 36.5 g/dL    RDW  12.1 10.0 - 15.0 %    Platelet Count 250 150 - 450 10e3/uL    % Neutrophils 65 %    % Lymphocytes 25 %    % Monocytes 7 %    % Eosinophils 1 %    % Basophils 1 %    % Immature Granulocytes 1 %    NRBCs per 100 WBC 0 <1 /100    Absolute Neutrophils 5.6 1.6 - 8.3 10e3/uL    Absolute Lymphocytes 2.1 0.8 - 5.3 10e3/uL    Absolute Monocytes 0.6 0.0 - 1.3 10e3/uL    Absolute Eosinophils 0.1 0.0 - 0.7 10e3/uL    Absolute Basophils 0.0 0.0 - 0.2 10e3/uL    Absolute Immature Granulocytes 0.1 <=0.4 10e3/uL    Absolute NRBCs 0.0 10e3/uL   [place] Draw     Status: None    Narrative    The following orders were created for panel order [place] Draw.  Procedure                               Abnormality         Status                     ---------                               -----------         ------                     Extra Red Top Tube[[id number]]                               Final result                 Please view results for these tests on the individual orders.   Extra Red Top Tube     Status: None   Result Value Ref Range    Hold Specimen [facility]    CBC with platelets differential     Status: None    Narrative    The following orders were created for panel order CBC with platelets differential.  Procedure                               Abnormality         Status                     ---------                               -----------         ------                     CBC with platelets and d...[[id number]]                      Final result                 Please view results for these tests on the individual orders.     Medications   lactated ringers BOLUS 1,000 mL (has no administration in time range)     Followed by   lactated ringers infusion (has no administration in time range)   acetaminophen (TYLENOL) tablet 1,000 mg (1,000 mg Oral Not Given 4/9/23 1907)   lactated ringers BOLUS 1,000 mL (0 mLs Intravenous Stopped 4/9/23 1908)     Labs Ordered and Resulted from Time of ED Arrival to Time of ED Departure   COMPREHENSIVE  METABOLIC PANEL - Abnormal       Result Value    Sodium 140      Potassium 3.7      Chloride 100      Carbon Dioxide (CO2) 26      Anion Gap 14      Urea Nitrogen 13.0      Creatinine 1.06      Calcium 9.3      Glucose 165 (*)     Alkaline Phosphatase 76      AST 19      ALT 19      Protein Total 7.3      Albumin 4.7      Bilirubin Total 1.1      GFR Estimate >90     TROPONIN T, HIGH SENSITIVITY - Normal    Troponin T, High Sensitivity <6     MAGNESIUM - Normal    Magnesium 1.8     D DIMER QUANTITATIVE - Normal    D-Dimer Quantitative <0.27     INR - Normal    INR 1.01     NT PROBNP INPATIENT - Normal    N terminal Pro BNP Inpatient <5     INFLUENZA A/B, RSV, & SARS-COV2 PCR - Normal    Influenza A PCR Negative      Influenza B PCR Negative      RSV PCR Negative      SARS CoV2 PCR Negative     CBC WITH PLATELETS AND DIFFERENTIAL    WBC Count 8.4      RBC Count 5.01      Hemoglobin 13.7      Hematocrit 41.9      MCV 84      MCH 27.3      MCHC 32.7      RDW 12.1      Platelet Count 250      % Neutrophils 65      % Lymphocytes 25      % Monocytes 7      % Eosinophils 1      % Basophils 1      % Immature Granulocytes 1      NRBCs per 100 WBC 0      Absolute Neutrophils 5.6      Absolute Lymphocytes 2.1      Absolute Monocytes 0.6      Absolute Eosinophils 0.1      Absolute Basophils 0.0      Absolute Immature Granulocytes 0.1      Absolute NRBCs 0.0       XR Chest 2 Views   Final Result   Impression: No acute airspace disease.       I have personally reviewed the examination and initial interpretation   and I agree with the findings.      TORIE RUSSELL DO            SYSTEM ID:  [id number]             Critical care was not performed.     Medical Decision Making  The patient's presentation was of moderate complexity (an undiagnosed new problem with uncertain diagnosis).    The patient's evaluation involved:  ordering and/or review of 3+ test(s) in this encounter (see separate area of note for details)  independent  interpretation of testing performed by another health professional (see separate area of note for details)    The patient's management necessitated moderate risk (prescription drug management including medications given in the ED).    Assessment & Plan    Santino Cutler is a 33 year old male with a past medical history of severe alcohol use disorder s/p IOP and relapse prevention aftercare program and past hypertension is brought to the ED from from the floor for evaluation of light headedness, chest pain and tightness.  Triage vitals reviewed.  Mild tachycardia, slight elevated blood pressure.  Other vital signs within normal limits.  Resting comfortably no acute distress.  Recent viral URI.  Differential includes atypical presentation of ACS, symptoms present long enough that we will exclude with single high-sensitivity troponin negative.  We will get a D-dimer to rule out pulmonary embolism.  X-ray to rule out bacterial pneumonia, pneumothorax.  No vomiting, do not SPECT Boerhaave's.  Relatively comfortable minimal symptoms equal pulses bilaterally low suspicion for acute aortic syndrome.  Patient with admitted dehydration as well as heavy caffeine use today which is routine for his job.    EKG shows a sinus tachycardia without acute ischemic changes, no electrical activity of significant right heart strain.    D-dimer undetectably low, PE unlikely.  High-sensitivity troponin undetectable, ACS unlikely.  BNP within normal limits.  Cardiomyopathy unlikely in this clinical setting.  Metabolic panel without a leukocytosis, normal hemoglobin.  Electrolytes reassuring.  No significant ectopic cardiac activity or palpitations to suggest caffeine intoxication.    Preliminary x-ray with some peribronchial cuffing, no acute process on final read.  My independent interpretation did not appreciate pneumothorax or pneumonia.    On reassessment patient's heart rate is come down after IV fluids.  He is moving good air.   Having occasional cough.  Suspect viral URI/viral syndrome that he is recovering from over the last week combined with dehydration.  Patient appears stable to discharge to outpatient follow-up.    We will provide work note.  Will provide albuterol inhaler prescription if coughing or chest tightness worsens.  Patient does not have primary care, will provide referral to help establish follow-up.  Return precautions for worsening respiratory symptoms, chest pain, lightheadedness.  Patient agreed with plan for discharge, no questions comments or concerns upon final assessment.    I have reviewed the nursing notes. I have reviewed the findings, diagnosis, plan and need for follow up with the patient.    New Prescriptions    ALBUTEROL (PROAIR HFA/PROVENTIL HFA/VENTOLIN HFA) 108 (90 BASE) MCG/ACT INHALER    Inhale 2 puffs into the lungs every 6 hours as needed for shortness of breath, wheezing or cough       Final diagnoses:   Viral URI   Dehydration   Chest tightness   ILaci am serving as a trained medical scribe to document services personally performed by Ezekile Son MD, based on the provider's statements to me.      Ezekiel AVILES MD, was physically present and have reviewed and verified the accuracy of this note documented by Laci Motta.     Ezekiel Son Jr., MD   [facility] EMERGENCY DEPARTMENT  4/9/2023     Ezekiel Son MD  04/09/23 1916

## 2023-04-09 NOTE — Clinical Note
Santino Cutler was seen and treated in our emergency department on 4/9/2023.  He may return to work on 04/11/2023.       If you have any questions or concerns, please don't hesitate to call.      Ezekiel Barrett MD

## 2023-04-10 LAB
ATRIAL RATE - MUSE: 108 BPM
DIASTOLIC BLOOD PRESSURE - MUSE: NORMAL MMHG
INTERPRETATION ECG - MUSE: NORMAL
P AXIS - MUSE: 73 DEGREES
PR INTERVAL - MUSE: 138 MS
QRS DURATION - MUSE: 94 MS
QT - MUSE: 342 MS
QTC - MUSE: 458 MS
R AXIS - MUSE: -67 DEGREES
SYSTOLIC BLOOD PRESSURE - MUSE: NORMAL MMHG
T AXIS - MUSE: 57 DEGREES
VENTRICULAR RATE- MUSE: 108 BPM